# Patient Record
Sex: MALE | Race: BLACK OR AFRICAN AMERICAN | NOT HISPANIC OR LATINO | Employment: OTHER | URBAN - METROPOLITAN AREA
[De-identification: names, ages, dates, MRNs, and addresses within clinical notes are randomized per-mention and may not be internally consistent; named-entity substitution may affect disease eponyms.]

---

## 2017-03-31 PROBLEM — M79.605 LEFT LEG PAIN: Status: ACTIVE | Noted: 2017-03-31

## 2018-05-09 ENCOUNTER — TELEPHONE (OUTPATIENT)
Dept: ADMINISTRATIVE | Facility: HOSPITAL | Age: 58
End: 2018-05-09

## 2019-07-21 PROBLEM — K92.2 GI BLEED: Status: ACTIVE | Noted: 2019-07-21

## 2019-07-22 ENCOUNTER — HOSPITAL ENCOUNTER (INPATIENT)
Facility: HOSPITAL | Age: 59
LOS: 7 days | Discharge: HOME | DRG: 378 | End: 2019-07-29
Attending: INTERNAL MEDICINE | Admitting: INTERNAL MEDICINE
Payer: MEDICARE

## 2019-07-22 ENCOUNTER — ANESTHESIA EVENT (OUTPATIENT)
Dept: ENDOSCOPY | Facility: HOSPITAL | Age: 59
DRG: 378 | End: 2019-07-22
Payer: MEDICARE

## 2019-07-22 ENCOUNTER — ANESTHESIA (OUTPATIENT)
Dept: ENDOSCOPY | Facility: HOSPITAL | Age: 59
DRG: 378 | End: 2019-07-22
Payer: MEDICARE

## 2019-07-22 DIAGNOSIS — K92.2 GI BLEED: ICD-10-CM

## 2019-07-22 DIAGNOSIS — K92.2 GASTROINTESTINAL HEMORRHAGE, UNSPECIFIED GASTROINTESTINAL HEMORRHAGE TYPE: ICD-10-CM

## 2019-07-22 DIAGNOSIS — E53.8 CYANOCOBALAMINE DEFICIENCY (NON ANEMIC): ICD-10-CM

## 2019-07-22 PROBLEM — F10.10 ALCOHOL ABUSE: Status: ACTIVE | Noted: 2019-07-22

## 2019-07-22 LAB
ABO + RH BLD: NORMAL
ALBUMIN SERPL BCP-MCNC: 2.6 G/DL (ref 3.5–5.2)
ALP SERPL-CCNC: 58 U/L (ref 55–135)
ALT SERPL W/O P-5'-P-CCNC: 46 U/L (ref 10–44)
AMPHET+METHAMPHET UR QL: NEGATIVE
ANION GAP SERPL CALC-SCNC: 11 MMOL/L (ref 8–16)
AST SERPL-CCNC: 115 U/L (ref 10–40)
BARBITURATES UR QL SCN>200 NG/ML: NEGATIVE
BASOPHILS # BLD AUTO: 0.04 K/UL (ref 0–0.2)
BASOPHILS # BLD AUTO: 0.06 K/UL (ref 0–0.2)
BASOPHILS # BLD AUTO: 0.06 K/UL (ref 0–0.2)
BASOPHILS # BLD AUTO: 0.08 K/UL (ref 0–0.2)
BASOPHILS NFR BLD: 0.6 % (ref 0–1.9)
BASOPHILS NFR BLD: 0.7 % (ref 0–1.9)
BASOPHILS NFR BLD: 0.8 % (ref 0–1.9)
BASOPHILS NFR BLD: 1 % (ref 0–1.9)
BENZODIAZ UR QL SCN>200 NG/ML: NEGATIVE
BILIRUB SERPL-MCNC: 3.2 MG/DL (ref 0.1–1)
BLD GP AB SCN CELLS X3 SERPL QL: NORMAL
BLD PROD TYP BPU: NORMAL
BLOOD UNIT EXPIRATION DATE: NORMAL
BLOOD UNIT TYPE CODE: 600
BLOOD UNIT TYPE: NORMAL
BUN SERPL-MCNC: 21 MG/DL (ref 6–20)
BZE UR QL SCN: NORMAL
CALCIUM SERPL-MCNC: 9.3 MG/DL (ref 8.7–10.5)
CANNABINOIDS UR QL SCN: NORMAL
CHLORIDE SERPL-SCNC: 110 MMOL/L (ref 95–110)
CO2 SERPL-SCNC: 25 MMOL/L (ref 23–29)
CODING SYSTEM: NORMAL
CREAT SERPL-MCNC: 0.9 MG/DL (ref 0.5–1.4)
CREAT UR-MCNC: 85 MG/DL (ref 23–375)
DIFFERENTIAL METHOD: ABNORMAL
DISPENSE STATUS: NORMAL
EOSINOPHIL # BLD AUTO: 0 K/UL (ref 0–0.5)
EOSINOPHIL # BLD AUTO: 0 K/UL (ref 0–0.5)
EOSINOPHIL # BLD AUTO: 0.1 K/UL (ref 0–0.5)
EOSINOPHIL # BLD AUTO: 0.1 K/UL (ref 0–0.5)
EOSINOPHIL NFR BLD: 0.2 % (ref 0–8)
EOSINOPHIL NFR BLD: 0.5 % (ref 0–8)
EOSINOPHIL NFR BLD: 0.8 % (ref 0–8)
EOSINOPHIL NFR BLD: 1.1 % (ref 0–8)
ERYTHROCYTE [DISTWIDTH] IN BLOOD BY AUTOMATED COUNT: 22.4 % (ref 11.5–14.5)
ERYTHROCYTE [DISTWIDTH] IN BLOOD BY AUTOMATED COUNT: 22.7 % (ref 11.5–14.5)
ERYTHROCYTE [DISTWIDTH] IN BLOOD BY AUTOMATED COUNT: 23.9 % (ref 11.5–14.5)
ERYTHROCYTE [DISTWIDTH] IN BLOOD BY AUTOMATED COUNT: 24 % (ref 11.5–14.5)
EST. GFR  (AFRICAN AMERICAN): >60 ML/MIN/1.73 M^2
EST. GFR  (NON AFRICAN AMERICAN): >60 ML/MIN/1.73 M^2
ETHANOL UR-MCNC: <10 MG/DL
GLUCOSE SERPL-MCNC: 110 MG/DL (ref 70–110)
HCT VFR BLD AUTO: 22 % (ref 40–54)
HCT VFR BLD AUTO: 23.1 % (ref 40–54)
HCT VFR BLD AUTO: 23.7 % (ref 40–54)
HCT VFR BLD AUTO: 23.8 % (ref 40–54)
HGB BLD-MCNC: 6.9 G/DL (ref 14–18)
HGB BLD-MCNC: 7.1 G/DL (ref 14–18)
HGB BLD-MCNC: 7.5 G/DL (ref 14–18)
HGB BLD-MCNC: 7.6 G/DL (ref 14–18)
IMM GRANULOCYTES # BLD AUTO: 0.05 K/UL (ref 0–0.04)
IMM GRANULOCYTES # BLD AUTO: 0.06 K/UL (ref 0–0.04)
IMM GRANULOCYTES # BLD AUTO: 0.06 K/UL (ref 0–0.04)
IMM GRANULOCYTES # BLD AUTO: 0.07 K/UL (ref 0–0.04)
IMM GRANULOCYTES NFR BLD AUTO: 0.6 % (ref 0–0.5)
IMM GRANULOCYTES NFR BLD AUTO: 0.7 % (ref 0–0.5)
IMM GRANULOCYTES NFR BLD AUTO: 0.9 % (ref 0–0.5)
IMM GRANULOCYTES NFR BLD AUTO: 0.9 % (ref 0–0.5)
INR PPP: 1.3 (ref 0.8–1.2)
LYMPHOCYTES # BLD AUTO: 1 K/UL (ref 1–4.8)
LYMPHOCYTES # BLD AUTO: 1.4 K/UL (ref 1–4.8)
LYMPHOCYTES # BLD AUTO: 1.6 K/UL (ref 1–4.8)
LYMPHOCYTES # BLD AUTO: 1.6 K/UL (ref 1–4.8)
LYMPHOCYTES NFR BLD: 13.1 % (ref 18–48)
LYMPHOCYTES NFR BLD: 17.9 % (ref 18–48)
LYMPHOCYTES NFR BLD: 19.2 % (ref 18–48)
LYMPHOCYTES NFR BLD: 22.3 % (ref 18–48)
MAGNESIUM SERPL-MCNC: 1.5 MG/DL (ref 1.6–2.6)
MAGNESIUM SERPL-MCNC: 2.3 MG/DL (ref 1.6–2.6)
MCH RBC QN AUTO: 22.7 PG (ref 27–31)
MCH RBC QN AUTO: 23 PG (ref 27–31)
MCH RBC QN AUTO: 24.3 PG (ref 27–31)
MCH RBC QN AUTO: 24.7 PG (ref 27–31)
MCHC RBC AUTO-ENTMCNC: 30.7 G/DL (ref 32–36)
MCHC RBC AUTO-ENTMCNC: 31.4 G/DL (ref 32–36)
MCHC RBC AUTO-ENTMCNC: 31.5 G/DL (ref 32–36)
MCHC RBC AUTO-ENTMCNC: 32.1 G/DL (ref 32–36)
MCV RBC AUTO: 73 FL (ref 82–98)
MCV RBC AUTO: 74 FL (ref 82–98)
MCV RBC AUTO: 77 FL (ref 82–98)
MCV RBC AUTO: 77 FL (ref 82–98)
METHADONE UR QL SCN>300 NG/ML: NEGATIVE
MONOCYTES # BLD AUTO: 0.7 K/UL (ref 0.3–1)
MONOCYTES # BLD AUTO: 0.9 K/UL (ref 0.3–1)
MONOCYTES # BLD AUTO: 0.9 K/UL (ref 0.3–1)
MONOCYTES # BLD AUTO: 1.2 K/UL (ref 0.3–1)
MONOCYTES NFR BLD: 14.8 % (ref 4–15)
MONOCYTES NFR BLD: 15.3 % (ref 4–15)
MONOCYTES NFR BLD: 9.3 % (ref 4–15)
MONOCYTES NFR BLD: 9.6 % (ref 4–15)
NEUTROPHILS # BLD AUTO: 3.9 K/UL (ref 1.8–7.7)
NEUTROPHILS # BLD AUTO: 5.1 K/UL (ref 1.8–7.7)
NEUTROPHILS # BLD AUTO: 5.9 K/UL (ref 1.8–7.7)
NEUTROPHILS # BLD AUTO: 6.2 K/UL (ref 1.8–7.7)
NEUTROPHILS NFR BLD: 60.9 % (ref 38–73)
NEUTROPHILS NFR BLD: 63.4 % (ref 38–73)
NEUTROPHILS NFR BLD: 70 % (ref 38–73)
NEUTROPHILS NFR BLD: 75.4 % (ref 38–73)
NRBC BLD-RTO: 0 /100 WBC
NRBC BLD-RTO: 1 /100 WBC
OPIATES UR QL SCN: NORMAL
PCP UR QL SCN>25 NG/ML: NEGATIVE
PHOSPHATE SERPL-MCNC: 3.9 MG/DL (ref 2.7–4.5)
PLATELET # BLD AUTO: 54 K/UL (ref 150–350)
PLATELET # BLD AUTO: 58 K/UL (ref 150–350)
PLATELET # BLD AUTO: 70 K/UL (ref 150–350)
PLATELET # BLD AUTO: 72 K/UL (ref 150–350)
PMV BLD AUTO: 9.9 FL (ref 9.2–12.9)
PMV BLD AUTO: ABNORMAL FL (ref 9.2–12.9)
POCT GLUCOSE: 123 MG/DL (ref 70–110)
POTASSIUM SERPL-SCNC: 4.2 MMOL/L (ref 3.5–5.1)
PROT SERPL-MCNC: 6.3 G/DL (ref 6–8.4)
PROTHROMBIN TIME: 12.6 SEC (ref 9–12.5)
RBC # BLD AUTO: 3 M/UL (ref 4.6–6.2)
RBC # BLD AUTO: 3.08 M/UL (ref 4.6–6.2)
RBC # BLD AUTO: 3.09 M/UL (ref 4.6–6.2)
RBC # BLD AUTO: 3.13 M/UL (ref 4.6–6.2)
SODIUM SERPL-SCNC: 146 MMOL/L (ref 136–145)
TOXICOLOGY INFORMATION: NORMAL
TRANS ERYTHROCYTES VOL PATIENT: NORMAL ML
TROPONIN I SERPL DL<=0.01 NG/ML-MCNC: 0.02 NG/ML (ref 0–0.03)
WBC # BLD AUTO: 6.37 K/UL (ref 3.9–12.7)
WBC # BLD AUTO: 7.76 K/UL (ref 3.9–12.7)
WBC # BLD AUTO: 8.08 K/UL (ref 3.9–12.7)
WBC # BLD AUTO: 8.83 K/UL (ref 3.9–12.7)

## 2019-07-22 PROCEDURE — 85025 COMPLETE CBC W/AUTO DIFF WBC: CPT

## 2019-07-22 PROCEDURE — 80307 DRUG TEST PRSMV CHEM ANLYZR: CPT

## 2019-07-22 PROCEDURE — C9113 INJ PANTOPRAZOLE SODIUM, VIA: HCPCS | Performed by: STUDENT IN AN ORGANIZED HEALTH CARE EDUCATION/TRAINING PROGRAM

## 2019-07-22 PROCEDURE — 20000000 HC ICU ROOM

## 2019-07-22 PROCEDURE — 43235 EGD DIAGNOSTIC BRUSH WASH: CPT | Performed by: INTERNAL MEDICINE

## 2019-07-22 PROCEDURE — 63600175 PHARM REV CODE 636 W HCPCS: Performed by: STUDENT IN AN ORGANIZED HEALTH CARE EDUCATION/TRAINING PROGRAM

## 2019-07-22 PROCEDURE — 99223 PR INITIAL HOSPITAL CARE,LEVL III: ICD-10-PCS | Mod: AI,GC,, | Performed by: INTERNAL MEDICINE

## 2019-07-22 PROCEDURE — 99232 PR SUBSEQUENT HOSPITAL CARE,LEVL II: ICD-10-PCS | Mod: 25,GC,, | Performed by: INTERNAL MEDICINE

## 2019-07-22 PROCEDURE — P9021 RED BLOOD CELLS UNIT: HCPCS

## 2019-07-22 PROCEDURE — 85610 PROTHROMBIN TIME: CPT

## 2019-07-22 PROCEDURE — 93010 EKG 12-LEAD: ICD-10-PCS | Mod: ,,, | Performed by: INTERNAL MEDICINE

## 2019-07-22 PROCEDURE — 43235 PR EGD, FLEX, DIAGNOSTIC: ICD-10-PCS | Mod: GC,,, | Performed by: INTERNAL MEDICINE

## 2019-07-22 PROCEDURE — 83735 ASSAY OF MAGNESIUM: CPT | Mod: 91

## 2019-07-22 PROCEDURE — 43235 EGD DIAGNOSTIC BRUSH WASH: CPT | Mod: GC,,, | Performed by: INTERNAL MEDICINE

## 2019-07-22 PROCEDURE — 84484 ASSAY OF TROPONIN QUANT: CPT

## 2019-07-22 PROCEDURE — D9220A PRA ANESTHESIA: Mod: CRNA,,, | Performed by: NURSE ANESTHETIST, CERTIFIED REGISTERED

## 2019-07-22 PROCEDURE — D9220A PRA ANESTHESIA: ICD-10-PCS | Mod: CRNA,,, | Performed by: NURSE ANESTHETIST, CERTIFIED REGISTERED

## 2019-07-22 PROCEDURE — 25000003 PHARM REV CODE 250: Performed by: STUDENT IN AN ORGANIZED HEALTH CARE EDUCATION/TRAINING PROGRAM

## 2019-07-22 PROCEDURE — D9220A PRA ANESTHESIA: Mod: ANES,,, | Performed by: ANESTHESIOLOGY

## 2019-07-22 PROCEDURE — 99232 SBSQ HOSP IP/OBS MODERATE 35: CPT | Mod: 25,GC,, | Performed by: INTERNAL MEDICINE

## 2019-07-22 PROCEDURE — 37000009 HC ANESTHESIA EA ADD 15 MINS: Performed by: INTERNAL MEDICINE

## 2019-07-22 PROCEDURE — 36430 TRANSFUSION BLD/BLD COMPNT: CPT

## 2019-07-22 PROCEDURE — 84100 ASSAY OF PHOSPHORUS: CPT

## 2019-07-22 PROCEDURE — 83735 ASSAY OF MAGNESIUM: CPT

## 2019-07-22 PROCEDURE — 63600175 PHARM REV CODE 636 W HCPCS: Performed by: NURSE ANESTHETIST, CERTIFIED REGISTERED

## 2019-07-22 PROCEDURE — 93005 ELECTROCARDIOGRAM TRACING: CPT

## 2019-07-22 PROCEDURE — 94761 N-INVAS EAR/PLS OXIMETRY MLT: CPT

## 2019-07-22 PROCEDURE — 86850 RBC ANTIBODY SCREEN: CPT

## 2019-07-22 PROCEDURE — 80053 COMPREHEN METABOLIC PANEL: CPT

## 2019-07-22 PROCEDURE — 86920 COMPATIBILITY TEST SPIN: CPT

## 2019-07-22 PROCEDURE — 37000008 HC ANESTHESIA 1ST 15 MINUTES: Performed by: INTERNAL MEDICINE

## 2019-07-22 PROCEDURE — 99223 1ST HOSP IP/OBS HIGH 75: CPT | Mod: AI,GC,, | Performed by: INTERNAL MEDICINE

## 2019-07-22 PROCEDURE — 93010 ELECTROCARDIOGRAM REPORT: CPT | Mod: ,,, | Performed by: INTERNAL MEDICINE

## 2019-07-22 PROCEDURE — D9220A PRA ANESTHESIA: ICD-10-PCS | Mod: ANES,,, | Performed by: ANESTHESIOLOGY

## 2019-07-22 RX ORDER — CEFTRIAXONE 1 G/1
1 INJECTION, POWDER, FOR SOLUTION INTRAMUSCULAR; INTRAVENOUS
Status: COMPLETED | OUTPATIENT
Start: 2019-07-22 | End: 2019-07-25

## 2019-07-22 RX ORDER — DIAZEPAM 5 MG/1
10 TABLET ORAL EVERY 6 HOURS PRN
Status: DISCONTINUED | OUTPATIENT
Start: 2019-07-22 | End: 2019-07-29 | Stop reason: HOSPADM

## 2019-07-22 RX ORDER — LIDOCAINE 50 MG/G
1 PATCH TOPICAL
Status: DISCONTINUED | OUTPATIENT
Start: 2019-07-22 | End: 2019-07-29 | Stop reason: HOSPADM

## 2019-07-22 RX ORDER — FOLIC ACID 1 MG/1
1 TABLET ORAL DAILY
Status: DISCONTINUED | OUTPATIENT
Start: 2019-07-22 | End: 2019-07-29 | Stop reason: HOSPADM

## 2019-07-22 RX ORDER — PANTOPRAZOLE SODIUM 40 MG/10ML
40 INJECTION, POWDER, LYOPHILIZED, FOR SOLUTION INTRAVENOUS DAILY
Status: DISCONTINUED | OUTPATIENT
Start: 2019-07-22 | End: 2019-07-22

## 2019-07-22 RX ORDER — PHENYLEPHRINE HYDROCHLORIDE 10 MG/ML
INJECTION INTRAVENOUS
Status: DISCONTINUED | OUTPATIENT
Start: 2019-07-22 | End: 2019-07-22

## 2019-07-22 RX ORDER — LORAZEPAM 2 MG/ML
2 INJECTION INTRAMUSCULAR ONCE
Status: COMPLETED | OUTPATIENT
Start: 2019-07-22 | End: 2019-07-22

## 2019-07-22 RX ORDER — OXYCODONE HYDROCHLORIDE 5 MG/1
5 TABLET ORAL ONCE
Status: COMPLETED | OUTPATIENT
Start: 2019-07-23 | End: 2019-07-22

## 2019-07-22 RX ORDER — MAGNESIUM SULFATE HEPTAHYDRATE 40 MG/ML
2 INJECTION, SOLUTION INTRAVENOUS
Status: COMPLETED | OUTPATIENT
Start: 2019-07-22 | End: 2019-07-22

## 2019-07-22 RX ORDER — PANTOPRAZOLE SODIUM 40 MG/10ML
40 INJECTION, POWDER, LYOPHILIZED, FOR SOLUTION INTRAVENOUS 2 TIMES DAILY
Status: DISCONTINUED | OUTPATIENT
Start: 2019-07-22 | End: 2019-07-23

## 2019-07-22 RX ORDER — LIDOCAINE HCL/PF 100 MG/5ML
SYRINGE (ML) INTRAVENOUS
Status: DISCONTINUED | OUTPATIENT
Start: 2019-07-22 | End: 2019-07-22

## 2019-07-22 RX ORDER — CEFTRIAXONE 1 G/1
1 INJECTION, POWDER, FOR SOLUTION INTRAMUSCULAR; INTRAVENOUS
Status: DISCONTINUED | OUTPATIENT
Start: 2019-07-22 | End: 2019-07-22

## 2019-07-22 RX ORDER — THIAMINE HCL 100 MG
100 TABLET ORAL DAILY
Status: DISCONTINUED | OUTPATIENT
Start: 2019-07-22 | End: 2019-07-29 | Stop reason: HOSPADM

## 2019-07-22 RX ORDER — PANTOPRAZOLE SODIUM 40 MG/1
40 TABLET, DELAYED RELEASE ORAL
Status: DISCONTINUED | OUTPATIENT
Start: 2019-07-22 | End: 2019-07-22

## 2019-07-22 RX ORDER — PROPOFOL 10 MG/ML
VIAL (ML) INTRAVENOUS
Status: DISCONTINUED | OUTPATIENT
Start: 2019-07-22 | End: 2019-07-22

## 2019-07-22 RX ORDER — HYDROCODONE BITARTRATE AND ACETAMINOPHEN 500; 5 MG/1; MG/1
TABLET ORAL
Status: DISCONTINUED | OUTPATIENT
Start: 2019-07-22 | End: 2019-07-24

## 2019-07-22 RX ORDER — ONDANSETRON 2 MG/ML
4 INJECTION INTRAMUSCULAR; INTRAVENOUS EVERY 6 HOURS PRN
Status: DISCONTINUED | OUTPATIENT
Start: 2019-07-22 | End: 2019-07-29 | Stop reason: HOSPADM

## 2019-07-22 RX ADMIN — Medication 100 MG: at 08:07

## 2019-07-22 RX ADMIN — PROPOFOL 50 MG: 10 INJECTION, EMULSION INTRAVENOUS at 05:07

## 2019-07-22 RX ADMIN — LIDOCAINE HYDROCHLORIDE 100 MG: 20 INJECTION, SOLUTION INTRAVENOUS at 05:07

## 2019-07-22 RX ADMIN — LIDOCAINE 1 PATCH: 50 PATCH TOPICAL at 09:07

## 2019-07-22 RX ADMIN — FOLIC ACID 1 MG: 1 TABLET ORAL at 08:07

## 2019-07-22 RX ADMIN — OCTREOTIDE ACETATE 50 MCG/HR: 1000 INJECTION, SOLUTION INTRAVENOUS; SUBCUTANEOUS at 01:07

## 2019-07-22 RX ADMIN — OXYCODONE HYDROCHLORIDE 5 MG: 5 TABLET ORAL at 11:07

## 2019-07-22 RX ADMIN — MAGNESIUM SULFATE IN WATER 2 G: 40 INJECTION, SOLUTION INTRAVENOUS at 06:07

## 2019-07-22 RX ADMIN — SODIUM CHLORIDE 200 ML: 0.9 INJECTION, SOLUTION INTRAVENOUS at 06:07

## 2019-07-22 RX ADMIN — PANTOPRAZOLE SODIUM 8 MG/HR: 40 INJECTION, POWDER, FOR SOLUTION INTRAVENOUS at 01:07

## 2019-07-22 RX ADMIN — SODIUM CHLORIDE: 0.9 INJECTION, SOLUTION INTRAVENOUS at 05:07

## 2019-07-22 RX ADMIN — PANTOPRAZOLE SODIUM 40 MG: 40 INJECTION, POWDER, FOR SOLUTION INTRAVENOUS at 09:07

## 2019-07-22 RX ADMIN — LORAZEPAM 2 MG: 2 INJECTION INTRAMUSCULAR; INTRAVENOUS at 10:07

## 2019-07-22 RX ADMIN — CEFTRIAXONE SODIUM 1 G: 1 INJECTION, POWDER, FOR SOLUTION INTRAMUSCULAR; INTRAVENOUS at 06:07

## 2019-07-22 RX ADMIN — THERA TABS 1 TABLET: TAB at 08:07

## 2019-07-22 RX ADMIN — PHENYLEPHRINE HYDROCHLORIDE 200 MCG: 10 INJECTION INTRAVENOUS at 05:07

## 2019-07-22 RX ADMIN — MAGNESIUM SULFATE IN WATER 2 G: 40 INJECTION, SOLUTION INTRAVENOUS at 03:07

## 2019-07-22 RX ADMIN — Medication 100 MG: at 01:07

## 2019-07-22 RX ADMIN — PROPOFOL 50 MG: 10 INJECTION, EMULSION INTRAVENOUS at 06:07

## 2019-07-22 RX ADMIN — DIAZEPAM 10 MG: 5 TABLET ORAL at 10:07

## 2019-07-22 RX ADMIN — OCTREOTIDE ACETATE 50 MCG/HR: 1000 INJECTION, SOLUTION INTRAVENOUS; SUBCUTANEOUS at 09:07

## 2019-07-22 RX ADMIN — DIAZEPAM 10 MG: 5 TABLET ORAL at 04:07

## 2019-07-22 RX ADMIN — OCTREOTIDE ACETATE 50 MCG/HR: 1000 INJECTION, SOLUTION INTRAVENOUS; SUBCUTANEOUS at 06:07

## 2019-07-22 RX ADMIN — PROPOFOL 100 MG: 10 INJECTION, EMULSION INTRAVENOUS at 05:07

## 2019-07-22 NOTE — INTERVAL H&P NOTE
Pre-Procedure H and P Addendum    Patient seen and examined.  History and exam unchanged from prior history and physical.      Procedure: EGD  Indication: GI bleeding  ASA Class: per anesthesiology  Airway: normal  Neck Mobility: full range of motion  Mallampatti score: per anesthesia  History of anesthesia problems: no  Family history of anesthesia problems: no  Anesthesia Plan: MAC    Anesthesia/Surgery risks, benefits and alternative options discussed and understood by patient/family.          Active Hospital Problems    Diagnosis  POA    *GI bleed [K92.2]  Yes    Alcohol abuse [F10.10]  Unknown    Anemia [D64.9]  Yes    Bleeding gastric varices [I86.4]  Yes      Resolved Hospital Problems   No resolved problems to display.

## 2019-07-22 NOTE — SUBJECTIVE & OBJECTIVE
Past Medical History:   Diagnosis Date    Arthritis     Hep C w/o coma, chronic     Hypertension     Laennec's cirrhosis     Liver disease        Past Surgical History:   Procedure Laterality Date    COLONOSCOPY      COLONOSCOPY N/A 2/11/2016    Performed by Ale Gonzalez MD at [facility] (4TH FLR)    ESOPHAGOGASTRODUODENOSCOPY (EGD) N/A 2/11/2016    Performed by Ale Gonzalez MD at [facility] END[facility] (4TH FLR)    WRIST SURGERY         Review of patient's allergies indicates:   Allergen Reactions    Aspirin Other (See Comments)     Bleeding/Cirrhosis     Family History     Problem Relation (Age of Onset)    Cancer Father    Diabetes Mother    Heart disease Brother    Hypertension Mother        Tobacco Use    Smoking status: Current Every Day Smoker     Packs/day: 1.50     Years: 40.00     Pack years: 60.00     Types: Cigarettes    Smokeless tobacco: Never Used   Substance and Sexual Activity    Alcohol use: Yes     Alcohol/week: 7.2 oz     Types: 12 Cans of beer per week     Comment: daily    Drug use: No     Types: Marijuana    Sexual activity: Never     Review of Systems   Constitutional: Negative for activity change, appetite change, chills, diaphoresis, fatigue, fever and unexpected weight change.   HENT: Negative for trouble swallowing and voice change.    Eyes: Negative for photophobia, pain, discharge, redness, itching and visual disturbance.   Respiratory: Negative for cough and shortness of breath.    Cardiovascular: Negative for chest pain, palpitations and leg swelling.   Gastrointestinal: Positive for nausea and vomiting. Negative for abdominal distention, abdominal pain, anal bleeding, blood in stool, constipation, diarrhea and rectal pain.   Endocrine: Negative for cold intolerance, heat intolerance, polydipsia, polyphagia and polyuria.   Genitourinary: Negative for dysuria, frequency, hematuria and urgency.   Musculoskeletal: Positive for back pain. Negative for neck pain.   Skin:  Negative for color change, pallor, rash and wound.   Neurological: Negative for dizziness, seizures, weakness and light-headedness.     Objective:     Vital Signs (Most Recent):  Temp: 98.4 °F (36.9 °C) (07/22/19 0300)  Pulse: 69 (07/22/19 0600)  Resp: 14 (07/22/19 0600)  BP: 136/72 (07/22/19 0600)  SpO2: (!) 94 % (07/22/19 0600) Vital Signs (24h Range):  Temp:  [98 °F (36.7 °C)-99 °F (37.2 °C)] 98.4 °F (36.9 °C)  Pulse:  [] 69  Resp:  [13-24] 14  SpO2:  [94 %-100 %] 94 %  BP: (136-171)/(72-87) 136/72     Weight: 70.2 kg (154 lb 12.2 oz) (07/22/19 0005)  Body mass index is 24.24 kg/m².      Intake/Output Summary (Last 24 hours) at 7/22/2019 0714  Last data filed at 7/22/2019 0600  Gross per 24 hour   Intake 235 ml   Output 600 ml   Net -365 ml       Lines/Drains/Airways     Peripheral Intravenous Line                 Peripheral IV - Single Lumen 07/21/19 1702 18 G Left Antecubital less than 1 day         Peripheral IV - Single Lumen 07/21/19 1720 18 G Right Upper Arm less than 1 day         Peripheral IV - Single Lumen 07/21/19 1919 20 G Right Forearm less than 1 day                Physical Exam   Constitutional: He is oriented to person, place, and time. No distress.   HENT:   Head: Normocephalic and atraumatic.   Eyes: No scleral icterus.   Cardiovascular: Normal rate and regular rhythm.   Pulmonary/Chest: Effort normal and breath sounds normal.   Abdominal: Soft. Bowel sounds are normal.   Musculoskeletal: He exhibits no edema.   Neurological: He is alert and oriented to person, place, and time.   Skin: He is not diaphoretic.   Nursing note and vitals reviewed.      Significant Labs:  CBC:   Recent Labs   Lab 07/21/19  1720 07/22/19  0024 07/22/19  0600   WBC 8.99 8.08 6.37   HGB 5.8* 7.1* 6.9*   HCT 20.2* 23.1* 22.0*   * 70* 54*     CMP:   Recent Labs   Lab 07/22/19  0024      CALCIUM 9.3   ALBUMIN 2.6*   PROT 6.3   *   K 4.2   CO2 25      BUN 21*   CREATININE 0.9   ALKPHOS 58    ALT 46*   *   BILITOT 3.2*     Coagulation:   Recent Labs   Lab 07/22/19  0024   INR 1.3*       Significant Imaging:  Imaging results within the past 24 hours have been reviewed.

## 2019-07-22 NOTE — TRANSFER OF CARE
"Anesthesia Transfer of Care Note    Patient: Abdulkadir Gaines    Procedure(s) Performed: Procedure(s) (LRB):  EGD (ESOPHAGOGASTRODUODENOSCOPY) (N/A)    Patient location: ICU    Anesthesia Type: general    Transport from OR: Transported from OR on 2-3 L/min O2 by NC with adequate spontaneous ventilation    Post pain: adequate analgesia    Post assessment: no apparent anesthetic complications and tolerated procedure well    Post vital signs: stable    Level of consciousness: lethargic    Nausea/Vomiting: no nausea/vomiting    Complications: none    Transfer of care protocol was followed      Last vitals:   Visit Vitals  /66 (BP Location: Right arm, Patient Position: Lying)   Pulse 74   Temp 37.1 °C (98.7 °F) (Oral)   Resp 11   Ht 5' 7" (1.702 m)   Wt 70.2 kg (154 lb 12.2 oz)   SpO2 95%   BMI 24.24 kg/m²     "

## 2019-07-22 NOTE — SUBJECTIVE & OBJECTIVE
Past Medical History:   Diagnosis Date    Arthritis     Hep C w/o coma, chronic     Hypertension     Laennec's cirrhosis     Liver disease        Past Surgical History:   Procedure Laterality Date    COLONOSCOPY      COLONOSCOPY N/A 2/11/2016    Performed by Ale Gonzalez MD at [facility] END[facility] (4TH FLR)    ESOPHAGOGASTRODUODENOSCOPY (EGD) N/A 2/11/2016    Performed by Ale Gonzalez MD at [facility] ENDO (4TH FLR)    WRIST SURGERY         Review of patient's allergies indicates:   Allergen Reactions    Aspirin Other (See Comments)     Bleeding/Cirrhosis       Family History     Problem Relation (Age of Onset)    Cancer Father    Diabetes Mother    Heart disease Brother    Hypertension Mother        Tobacco Use    Smoking status: Current Every Day Smoker     Packs/day: 1.50     Years: 40.00     Pack years: 60.00     Types: Cigarettes    Smokeless tobacco: Never Used   Substance and Sexual Activity    Alcohol use: Yes     Alcohol/week: 7.2 oz     Types: 12 Cans of beer per week     Comment: daily    Drug use: No     Types: Marijuana    Sexual activity: Never      Review of Systems   Constitutional: Negative for chills, diaphoresis and fever.   HENT: Negative for congestion, trouble swallowing and voice change.    Eyes: Negative for photophobia and visual disturbance.   Respiratory: Negative for chest tightness and shortness of breath.    Cardiovascular: Negative for chest pain, palpitations and leg swelling.   Gastrointestinal: Positive for abdominal pain, nausea and vomiting. Negative for abdominal distention, anal bleeding and blood in stool.        Coffee ground emesis    Endocrine: Negative for polydipsia, polyphagia and polyuria.   Genitourinary: Negative for difficulty urinating, dysuria and testicular pain.   Musculoskeletal: Negative for arthralgias and gait problem.   Skin: Negative for pallor, rash and wound.   Neurological: Negative for dizziness, seizures and headaches.   Hematological:  Negative for adenopathy. Does not bruise/bleed easily.     Objective:     Vital Signs (Most Recent):  Temp: 98.5 °F (36.9 °C) (07/22/19 0005)  Pulse: 95 (07/22/19 0005)  Resp: 14 (07/22/19 0005)  BP: (!) 171/82 (07/22/19 0005)  SpO2: 95 % (07/22/19 0005) Vital Signs (24h Range):  Temp:  [98 °F (36.7 °C)-99 °F (37.2 °C)] 98.5 °F (36.9 °C)  Pulse:  [] 95  Resp:  [14-18] 14  SpO2:  [95 %-100 %] 95 %  BP: (140-171)/(73-82) 171/82   Weight: 70.2 kg (154 lb 12.2 oz)  Body mass index is 24.24 kg/m².    No intake or output data in the 24 hours ending 07/22/19 0049    Physical Exam   Constitutional: He is oriented to person, place, and time. He appears well-developed and well-nourished. No distress.   AA male, resting comfortably in bed, non toxic appearing    HENT:   Head: Normocephalic and atraumatic.   Eyes: Pupils are equal, round, and reactive to light. EOM are normal. Right eye exhibits no discharge. Left eye exhibits no discharge.   Neck: Normal range of motion. Neck supple.   Cardiovascular: Normal rate, regular rhythm, normal heart sounds and intact distal pulses. Exam reveals no gallop and no friction rub.   No murmur heard.  Pulmonary/Chest: Effort normal and breath sounds normal. No stridor. No respiratory distress. He has no wheezes. He has no rales.   Abdominal: Bowel sounds are normal. He exhibits no distension and no mass. There is tenderness. There is no rebound and no guarding.   TTP of epigastrum, no rebound, no guarding    Musculoskeletal: Normal range of motion. He exhibits no edema, tenderness or deformity.   Neurological: He is alert and oriented to person, place, and time. No cranial nerve deficit.   Skin: Skin is warm and dry. Capillary refill takes less than 2 seconds.       Vents:     Lines/Drains/Airways     Peripheral Intravenous Line                 Peripheral IV - Single Lumen 07/21/19 1702 18 G Left Antecubital less than 1 day         Peripheral IV - Single Lumen 07/21/19 1720 18 G Right  Upper Arm less than 1 day         Peripheral IV - Single Lumen 07/21/19 1919 20 G Right Forearm less than 1 day              Significant Labs:    CBC/Anemia Profile:  Recent Labs   Lab 07/21/19  1720 07/21/19  1811   WBC 8.99  --    HGB 5.8*  --    HCT 20.2*  --    *  --    MCV 71*  --    RDW 23.2*  --    OCCULTBLOOD  --  Positive*        Chemistries:  Recent Labs   Lab 07/21/19  1720   *   K 4.1      CO2 24   BUN 21*   CREATININE 0.8   CALCIUM 9.4   ALBUMIN 2.9*   PROT 7.0   BILITOT 1.6*   ALKPHOS 65   ALT 50*   *         Significant Imaging: I have reviewed all pertinent imaging results/findings within the past 24 hours.

## 2019-07-22 NOTE — H&P (VIEW-ONLY)
[facility]  Gastroenterology  Consult Note    Patient Name: Abdulkadir Gaines  MRN: [id number]  Admission Date: 7/22/2019  Hospital Length of Stay: 0 days  Code Status: No Order   Attending Provider: Ronnie Duran MD   Consulting Provider: Kina Jo MD  Primary Care Physician: June Rodriguez NP  Principal Problem:GI bleed    Inpatient consult to Gastroenterology  Consult performed by: Kina Jo MD  Consult ordered by: Mal Parra MD        Subjective:     HPI:  59 year old male with a history of HCV/Alcohol Cirrhosis on who GI is being consulted for for hematemesis.    Per patient he began drinking again and between Friday/Saturday he drank 1 case of beer ( 24 beers). He stayed in bed most of Saturday until she got up due to nausea. He reported multiple episodes of emesis which were progressively bloodier. This prompted him to go to an OSH ED yesterday. There he was noted to have a hemoglobin of 5.8 down from 8.8. He was started on antibiotics/PPI/Octreotide and 2 units of blood were ordered. He was then transferred to [facility]. Since arrival he has been stable with no further hematemesis, melena, or hematochezia.     Past Medical History:   Diagnosis Date    Arthritis     Hep C w/o coma, chronic     Hypertension     Laennec's cirrhosis     Liver disease        Past Surgical History:   Procedure Laterality Date    COLONOSCOPY      COLONOSCOPY N/A 2/11/2016    Performed by Ale [facility] MD at [facility] ENDO (4TH FLR)    ESOPHAGOGASTRODUODENOSCOPY (EGD) N/A 2/11/2016    Performed by Ale Gonzalez MD at [facility] ENDO (4TH FLR)    WRIST SURGERY         Review of patient's allergies indicates:   Allergen Reactions    Aspirin Other (See Comments)     Bleeding/Cirrhosis     Family History     Problem Relation (Age of Onset)    Cancer Father    Diabetes Mother    Heart disease Brother    Hypertension Mother        Tobacco Use    Smoking status: Current Every Day Smoker      Packs/day: 1.50     Years: 40.00     Pack years: 60.00     Types: Cigarettes    Smokeless tobacco: Never Used   Substance and Sexual Activity    Alcohol use: Yes     Alcohol/week: 7.2 oz     Types: 12 Cans of beer per week     Comment: daily    Drug use: No     Types: Marijuana    Sexual activity: Never     Review of Systems   Constitutional: Negative for activity change, appetite change, chills, diaphoresis, fatigue, fever and unexpected weight change.   HENT: Negative for trouble swallowing and voice change.    Eyes: Negative for photophobia, pain, discharge, redness, itching and visual disturbance.   Respiratory: Negative for cough and shortness of breath.    Cardiovascular: Negative for chest pain, palpitations and leg swelling.   Gastrointestinal: Positive for nausea and vomiting. Negative for abdominal distention, abdominal pain, anal bleeding, blood in stool, constipation, diarrhea and rectal pain.   Endocrine: Negative for cold intolerance, heat intolerance, polydipsia, polyphagia and polyuria.   Genitourinary: Negative for dysuria, frequency, hematuria and urgency.   Musculoskeletal: Positive for back pain. Negative for neck pain.   Skin: Negative for color change, pallor, rash and wound.   Neurological: Negative for dizziness, seizures, weakness and light-headedness.     Objective:     Vital Signs (Most Recent):  Temp: 98.4 °F (36.9 °C) (07/22/19 0300)  Pulse: 69 (07/22/19 0600)  Resp: 14 (07/22/19 0600)  BP: 136/72 (07/22/19 0600)  SpO2: (!) 94 % (07/22/19 0600) Vital Signs (24h Range):  Temp:  [98 °F (36.7 °C)-99 °F (37.2 °C)] 98.4 °F (36.9 °C)  Pulse:  [] 69  Resp:  [13-24] 14  SpO2:  [94 %-100 %] 94 %  BP: (136-171)/(72-87) 136/72     Weight: 70.2 kg (154 lb 12.2 oz) (07/22/19 0005)  Body mass index is 24.24 kg/m².      Intake/Output Summary (Last 24 hours) at 7/22/2019 0714  Last data filed at 7/22/2019 0600  Gross per 24 hour   Intake 235 ml   Output 600 ml   Net -365 ml        Lines/Drains/Airways     Peripheral Intravenous Line                 Peripheral IV - Single Lumen 07/21/19 1702 18 G Left Antecubital less than 1 day         Peripheral IV - Single Lumen 07/21/19 1720 18 G Right Upper Arm less than 1 day         Peripheral IV - Single Lumen 07/21/19 1919 20 G Right Forearm less than 1 day                Physical Exam   Constitutional: He is oriented to person, place, and time. No distress.   HENT:   Head: Normocephalic and atraumatic.   Eyes: No scleral icterus.   Cardiovascular: Normal rate and regular rhythm.   Pulmonary/Chest: Effort normal and breath sounds normal.   Abdominal: Soft. Bowel sounds are normal.   Musculoskeletal: He exhibits no edema.   Neurological: He is alert and oriented to person, place, and time.   Skin: He is not diaphoretic.   Nursing note and vitals reviewed.      Significant Labs:  CBC:   Recent Labs   Lab 07/21/19  1720 07/22/19  0024 07/22/19  0600   WBC 8.99 8.08 6.37   HGB 5.8* 7.1* 6.9*   HCT 20.2* 23.1* 22.0*   * 70* 54*     CMP:   Recent Labs   Lab 07/22/19  0024      CALCIUM 9.3   ALBUMIN 2.6*   PROT 6.3   *   K 4.2   CO2 25      BUN 21*   CREATININE 0.9   ALKPHOS 58   ALT 46*   *   BILITOT 3.2*     Coagulation:   Recent Labs   Lab 07/22/19  0024   INR 1.3*       Significant Imaging:  Imaging results within the past 24 hours have been reviewed.    Assessment/Plan:     * GI bleed  59 year old male with a history of HCV/Alcohol Cirrhosis on who GI is being consulted for for hematemesis. In the setting of cirrhosis with reported hematemesis will proceed with EGD today. Presentation could be from MV tear vs PHG vs less like an acute variceal bleed.    Recommendations:  -NPO  -Maintain IV access with 2 large bore IVs  -Intravascular resuscitation/support with IVFs   -Serial H/H's and pRBCs transfusion as indicated  -Protonix 80mg IV bolus x1 and then gtt at 8mg/hr  -Octreotide gtt at 50mcg/hr  -Ceftriaxone 1g IV Q  daily for primary SBP prophylaxis  -Discontinue all NSAIDs and Heparin products  -Please correct any coagulopathy with platelets and FFP to a goal of platelets >50K and INR <2.0  -Plan for EGD today  -Obtain US of the liver with doppler        Thank you for your consult. I will follow-up with patient. Please contact us if you have any additional questions.    Kina Jo M.D.  Gastroenterology Fellow, PGY-VI  Pager: [phone]  [facility]

## 2019-07-22 NOTE — HPI
59 year old AA male PMH hepatitis C, alcohol abuse, HTN, anemia, gastric variceal bleed who presented to [facility] with hematemesis. He states yesterday he had abdominal pain, then multiple episodes of coffee ground emesis. He was found to have hgb 5.8, FOBT+, type and screened, given 2 units of blood, protonix, octreotide, then transferred to [facility] for Gastroenterology. He state he has had this occur twice in the past. He does endorse drinking 24 pack of beer/day. He states he has not had withdrawal seizures before. Last time he drink was yesterday prior to coming to the hospital

## 2019-07-22 NOTE — H&P
[facility]  Critical Care Medicine  History & Physical    Patient Name: Abdulkadir Gaines  MRN: [id number]  Admission Date: 7/22/2019  Hospital Length of Stay: 0 days  Code Status: No Order  Attending Physician: Ronnie Duran MD   Primary Care Provider: June Rodriguez NP   Principal Problem: GI bleed    Subjective:     HPI:  59 year old AA male PMH hepatitis C, alcohol abuse, HTN, anemia, gastric variceal bleed who presented to [facility] with hematemesis. He states yesterday he had abdominal pain, then multiple episodes of coffee ground emesis. He was found to have hgb 5.8, FOBT+, type and screened, given 2 units of blood, protonix, octreotide, then transferred to [facility] for Gastroenterology. He state he has had this occur twice in the past. He does endorse drinking 24 pack of beer/day. He states he has not had withdrawal seizures before. Last time he drink was yesterday prior to coming to the hospital     Hospital/ICU Course:  No notes on file     Past Medical History:   Diagnosis Date    Arthritis     Hep C w/o coma, chronic     Hypertension     Laennec's cirrhosis     Liver disease        Past Surgical History:   Procedure Laterality Date    COLONOSCOPY      COLONOSCOPY N/A 2/11/2016    Performed by Ale Gonzalez MD at [facility] ENDO (4TH FLR)    ESOPHAGOGASTRODUODENOSCOPY (EGD) N/A 2/11/2016    Performed by Ale Gonzalez MD at [facility] ENDO (4TH FLR)    WRIST SURGERY         Review of patient's allergies indicates:   Allergen Reactions    Aspirin Other (See Comments)     Bleeding/Cirrhosis       Family History     Problem Relation (Age of Onset)    Cancer Father    Diabetes Mother    Heart disease Brother    Hypertension Mother        Tobacco Use    Smoking status: Current Every Day Smoker     Packs/day: 1.50     Years: 40.00     Pack years: 60.00     Types: Cigarettes    Smokeless tobacco: Never Used   Substance and Sexual Activity    Alcohol use: Yes      Alcohol/week: 7.2 oz     Types: 12 Cans of beer per week     Comment: daily    Drug use: No     Types: Marijuana    Sexual activity: Never      Review of Systems   Constitutional: Negative for chills, diaphoresis and fever.   HENT: Negative for congestion, trouble swallowing and voice change.    Eyes: Negative for photophobia and visual disturbance.   Respiratory: Negative for chest tightness and shortness of breath.    Cardiovascular: Negative for chest pain, palpitations and leg swelling.   Gastrointestinal: Positive for abdominal pain, nausea and vomiting. Negative for abdominal distention, anal bleeding and blood in stool.        Coffee ground emesis    Endocrine: Negative for polydipsia, polyphagia and polyuria.   Genitourinary: Negative for difficulty urinating, dysuria and testicular pain.   Musculoskeletal: Negative for arthralgias and gait problem.   Skin: Negative for pallor, rash and wound.   Neurological: Negative for dizziness, seizures and headaches.   Hematological: Negative for adenopathy. Does not bruise/bleed easily.     Objective:     Vital Signs (Most Recent):  Temp: 98.5 °F (36.9 °C) (07/22/19 0005)  Pulse: 95 (07/22/19 0005)  Resp: 14 (07/22/19 0005)  BP: (!) 171/82 (07/22/19 0005)  SpO2: 95 % (07/22/19 0005) Vital Signs (24h Range):  Temp:  [98 °F (36.7 °C)-99 °F (37.2 °C)] 98.5 °F (36.9 °C)  Pulse:  [] 95  Resp:  [14-18] 14  SpO2:  [95 %-100 %] 95 %  BP: (140-171)/(73-82) 171/82   Weight: 70.2 kg (154 lb 12.2 oz)  Body mass index is 24.24 kg/m².    No intake or output data in the 24 hours ending 07/22/19 0049    Physical Exam   Constitutional: He is oriented to person, place, and time. He appears well-developed and well-nourished. No distress.   AA male, resting comfortably in bed, non toxic appearing    HENT:   Head: Normocephalic and atraumatic.   Eyes: Pupils are equal, round, and reactive to light. EOM are normal. Right eye exhibits no discharge. Left eye exhibits no discharge.    Neck: Normal range of motion. Neck supple.   Cardiovascular: Normal rate, regular rhythm, normal heart sounds and intact distal pulses. Exam reveals no gallop and no friction rub.   No murmur heard.  Pulmonary/Chest: Effort normal and breath sounds normal. No stridor. No respiratory distress. He has no wheezes. He has no rales.   Abdominal: Bowel sounds are normal. He exhibits no distension and no mass. There is tenderness. There is no rebound and no guarding.   TTP of epigastrum, no rebound, no guarding    Musculoskeletal: Normal range of motion. He exhibits no edema, tenderness or deformity.   Neurological: He is alert and oriented to person, place, and time. No cranial nerve deficit.   Skin: Skin is warm and dry. Capillary refill takes less than 2 seconds.       Vents:     Lines/Drains/Airways     Peripheral Intravenous Line                 Peripheral IV - Single Lumen 07/21/19 1702 18 G Left Antecubital less than 1 day         Peripheral IV - Single Lumen 07/21/19 1720 18 G Right Upper Arm less than 1 day         Peripheral IV - Single Lumen 07/21/19 1919 20 G Right Forearm less than 1 day              Significant Labs:    CBC/Anemia Profile:  Recent Labs   Lab 07/21/19  1720 07/21/19  1811   WBC 8.99  --    HGB 5.8*  --    HCT 20.2*  --    *  --    MCV 71*  --    RDW 23.2*  --    OCCULTBLOOD  --  Positive*        Chemistries:  Recent Labs   Lab 07/21/19  1720   *   K 4.1      CO2 24   BUN 21*   CREATININE 0.8   CALCIUM 9.4   ALBUMIN 2.9*   PROT 7.0   BILITOT 1.6*   ALKPHOS 65   ALT 50*   *         Significant Imaging: I have reviewed all pertinent imaging results/findings within the past 24 hours.    Assessment/Plan:     Psychiatric  Alcohol abuse  -drinks 24 beers/day  -[facility] protocol iniated  -Diazepam PRN    Oncology  Anemia  -Hgb 5.8  -Given 2 units of PRBC  -Recheck CBC q6 hrs    GI  * GI bleed  -Hep C, Alcohol abuse  -hx of variceal bleed  -Protonix drip  -Octreotide  -Rocephin  QD  -Type and Screen  -GI consulted    Bleeding gastric varices  -See GI bleed        Critical Care Daily Checklist:    A: Awake: RASS Goal/Actual Goal:    Actual:     B: Spontaneous Breathing Trial Performed?     C: SAT & SBT Coordinated?                     D: Delirium: CAM-ICU     E: Early Mobility Performed? Yes   F: Feeding Goal:    Status:     Current Diet Order   Procedures    Diet NPO Except for: Medication     Order Specific Question:   Except for     Answer:   Medication      AS: Analgesia/Sedation NA   T: Thromboembolic Prophylaxis None, current GI bleed   H: HOB > 300 Yes   U: Stress Ulcer Prophylaxis (if needed) Protonix   G: Glucose Control N/A   B: Bowel Function     I: Indwelling Catheter (Lines & Pollack) Necessity 18 gauge b/l AC     D: De-escalation of Antimicrobials/Pharmacotherapies Rocephin QD    Plan for the day/ETD     Code Status:  Family/Goals of Care: No Order  Full        Critical secondary to Patient has a condition that poses threat to life and bodily function: GI bleed     Critical care was time spent personally by me on the following activities: development of treatment plan with patient or surrogate and bedside caregivers, discussions with consultants, evaluation of patient's response to treatment, examination of patient, ordering and performing treatments and interventions, ordering and review of laboratory studies, ordering and review of radiographic studies, pulse oximetry, re-evaluation of patient's condition. This critical care time did not overlap with that of any other provider or involve time for any procedures.     Mal Parra MD  Critical Care Medicine  [facility]

## 2019-07-22 NOTE — CONSULTS
[facility]  Gastroenterology  Consult Note    Patient Name: Abdulkadir Gaines  MRN: [id number]  Admission Date: 7/22/2019  Hospital Length of Stay: 0 days  Code Status: No Order   Attending Provider: Ronnie Duran MD   Consulting Provider: Kina Jo MD  Primary Care Physician: June Rodriguez NP  Principal Problem:GI bleed    Inpatient consult to Gastroenterology  Consult performed by: Kina Jo MD  Consult ordered by: Mal Parra MD        Subjective:     HPI:  59 year old male with a history of HCV/Alcohol Cirrhosis on who GI is being consulted for for hematemesis.    Per patient he began drinking again and between Friday/Saturday he drank 1 case of beer ( 24 beers). He stayed in bed most of Saturday until she got up due to nausea. He reported multiple episodes of emesis which were progressively bloodier. This prompted him to go to an OSH ED yesterday. There he was noted to have a hemoglobin of 5.8 down from 8.8. He was started on antibiotics/PPI/Octreotide and 2 units of blood were ordered. He was then transferred to [facility]. Since arrival he has been stable with no further hematemesis, melena, or hematochezia.     Past Medical History:   Diagnosis Date    Arthritis     Hep C w/o coma, chronic     Hypertension     Laennec's cirrhosis     Liver disease        Past Surgical History:   Procedure Laterality Date    COLONOSCOPY      COLONOSCOPY N/A 2/11/2016    Performed by [facility] MD at [facility] ENDO (4TH FLR)    ESOPHAGOGASTRODUODENOSCOPY (EGD) N/A 2/11/2016    Performed by Ale Gonzalez MD at [facility] ENDO (4TH FLR)    WRIST SURGERY         Review of patient's allergies indicates:   Allergen Reactions    Aspirin Other (See Comments)     Bleeding/Cirrhosis     Family History     Problem Relation (Age of Onset)    Cancer Father    Diabetes Mother    Heart disease Brother    Hypertension Mother        Tobacco Use    Smoking status: Current Every Day Smoker      Packs/day: 1.50     Years: 40.00     Pack years: 60.00     Types: Cigarettes    Smokeless tobacco: Never Used   Substance and Sexual Activity    Alcohol use: Yes     Alcohol/week: 7.2 oz     Types: 12 Cans of beer per week     Comment: daily    Drug use: No     Types: Marijuana    Sexual activity: Never     Review of Systems   Constitutional: Negative for activity change, appetite change, chills, diaphoresis, fatigue, fever and unexpected weight change.   HENT: Negative for trouble swallowing and voice change.    Eyes: Negative for photophobia, pain, discharge, redness, itching and visual disturbance.   Respiratory: Negative for cough and shortness of breath.    Cardiovascular: Negative for chest pain, palpitations and leg swelling.   Gastrointestinal: Positive for nausea and vomiting. Negative for abdominal distention, abdominal pain, anal bleeding, blood in stool, constipation, diarrhea and rectal pain.   Endocrine: Negative for cold intolerance, heat intolerance, polydipsia, polyphagia and polyuria.   Genitourinary: Negative for dysuria, frequency, hematuria and urgency.   Musculoskeletal: Positive for back pain. Negative for neck pain.   Skin: Negative for color change, pallor, rash and wound.   Neurological: Negative for dizziness, seizures, weakness and light-headedness.     Objective:     Vital Signs (Most Recent):  Temp: 98.4 °F (36.9 °C) (07/22/19 0300)  Pulse: 69 (07/22/19 0600)  Resp: 14 (07/22/19 0600)  BP: 136/72 (07/22/19 0600)  SpO2: (!) 94 % (07/22/19 0600) Vital Signs (24h Range):  Temp:  [98 °F (36.7 °C)-99 °F (37.2 °C)] 98.4 °F (36.9 °C)  Pulse:  [] 69  Resp:  [13-24] 14  SpO2:  [94 %-100 %] 94 %  BP: (136-171)/(72-87) 136/72     Weight: 70.2 kg (154 lb 12.2 oz) (07/22/19 0005)  Body mass index is 24.24 kg/m².      Intake/Output Summary (Last 24 hours) at 7/22/2019 0714  Last data filed at 7/22/2019 0600  Gross per 24 hour   Intake 235 ml   Output 600 ml   Net -365 ml        Lines/Drains/Airways     Peripheral Intravenous Line                 Peripheral IV - Single Lumen 07/21/19 1702 18 G Left Antecubital less than 1 day         Peripheral IV - Single Lumen 07/21/19 1720 18 G Right Upper Arm less than 1 day         Peripheral IV - Single Lumen 07/21/19 1919 20 G Right Forearm less than 1 day                Physical Exam   Constitutional: He is oriented to person, place, and time. No distress.   HENT:   Head: Normocephalic and atraumatic.   Eyes: No scleral icterus.   Cardiovascular: Normal rate and regular rhythm.   Pulmonary/Chest: Effort normal and breath sounds normal.   Abdominal: Soft. Bowel sounds are normal.   Musculoskeletal: He exhibits no edema.   Neurological: He is alert and oriented to person, place, and time.   Skin: He is not diaphoretic.   Nursing note and vitals reviewed.      Significant Labs:  CBC:   Recent Labs   Lab 07/21/19  1720 07/22/19  0024 07/22/19  0600   WBC 8.99 8.08 6.37   HGB 5.8* 7.1* 6.9*   HCT 20.2* 23.1* 22.0*   * 70* 54*     CMP:   Recent Labs   Lab 07/22/19  0024      CALCIUM 9.3   ALBUMIN 2.6*   PROT 6.3   *   K 4.2   CO2 25      BUN 21*   CREATININE 0.9   ALKPHOS 58   ALT 46*   *   BILITOT 3.2*     Coagulation:   Recent Labs   Lab 07/22/19  0024   INR 1.3*       Significant Imaging:  Imaging results within the past 24 hours have been reviewed.    Assessment/Plan:     * GI bleed  59 year old male with a history of HCV/Alcohol Cirrhosis on who GI is being consulted for for hematemesis. In the setting of cirrhosis with reported hematemesis will proceed with EGD today. Presentation could be from MV tear vs PHG vs less like an acute variceal bleed.    Recommendations:  -NPO  -Maintain IV access with 2 large bore IVs  -Intravascular resuscitation/support with IVFs   -Serial H/H's and pRBCs transfusion as indicated  -Protonix 80mg IV bolus x1 and then gtt at 8mg/hr  -Octreotide gtt at 50mcg/hr  -Ceftriaxone 1g IV Q  daily for primary SBP prophylaxis  -Discontinue all NSAIDs and Heparin products  -Please correct any coagulopathy with platelets and FFP to a goal of platelets >50K and INR <2.0  -Plan for EGD today  -Obtain US of the liver with doppler        Thank you for your consult. I will follow-up with patient. Please contact us if you have any additional questions.    Kina Jo M.D.  Gastroenterology Fellow, PGY-VI  Pager: [phone]  [facility]

## 2019-07-22 NOTE — HPI
59 year old male with a history of HCV/Alcohol Cirrhosis on who GI is being consulted for for hematemesis.    Per patient he began drinking again and between Friday/Saturday he drank 1 case of beer ( 24 beers). He stayed in bed most of Saturday until she got up due to nausea. He reported multiple episodes of emesis which were progressively bloodier. This prompted him to go to an OSH ED yesterday. There he was noted to have a hemoglobin of 5.8 down from 8.8. He was started on antibiotics/PPI/Octreotide and 2 units of blood were ordered. He was then transferred to [facility]. Since arrival he has been stable with no further hematemesis, melena, or hematochezia.

## 2019-07-22 NOTE — ASSESSMENT & PLAN NOTE
59 year old male with a history of HCV/Alcohol Cirrhosis on who GI is being consulted for for hematemesis. In the setting of cirrhosis with reported hematemesis will proceed with EGD today. Presentation could be from MV tear vs PHG vs less like an acute variceal bleed.    Recommendations:  -NPO  -Maintain IV access with 2 large bore IVs  -Intravascular resuscitation/support with IVFs   -Serial H/H's and pRBCs transfusion as indicated  -Protonix 80mg IV bolus x1 and then gtt at 8mg/hr  -Octreotide gtt at 50mcg/hr  -Ceftriaxone 1g IV Q daily for primary SBP prophylaxis  -Discontinue all NSAIDs and Heparin products  -Please correct any coagulopathy with platelets and FFP to a goal of platelets >50K and INR <2.0  -Plan for EGD today  -Obtain US of the liver with doppler

## 2019-07-22 NOTE — ASSESSMENT & PLAN NOTE
-Hep C, Alcohol abuse  -hx of variceal bleed  -Protonix drip  -Octreotide  -Rocephin QD  -Type and Screen  -GI consulted

## 2019-07-22 NOTE — ANESTHESIA PREPROCEDURE EVALUATION
07/22/2019  Abdulkadir Gaines is a 59 y.o., male.    Anesthesia Evaluation    I have reviewed the Patient Summary Reports.     I have reviewed the Medications.     Review of Systems  Anesthesia Hx:  No problems with previous Anesthesia  History of prior surgery of interest to airway management or planning: Previous anesthesia: General   Social:  Smoker, Social Alcohol Use    Hematology/Oncology:  Hematology Normal   Oncology Normal     EENT/Dental:EENT/Dental Normal   Cardiovascular:   Hypertension, well controlled    Pulmonary:  Pulmonary Normal    Renal/:  Renal/ Normal     Hepatic/GI:   GERD Hepatitis    Musculoskeletal:  Musculoskeletal Normal    Neurological:  Neurology Normal    Endocrine:  Endocrine Normal    Dermatological:  Skin Normal    Psych:   Psychiatric History          Physical Exam  General:  Well nourished    Airway/Jaw/Neck:  Airway Findings: Mouth Opening: Normal Tongue: Normal  General Airway Assessment: Adult  Mallampati: II  TM Distance: Normal, at least 6 cm  Jaw/Neck Findings:  Neck ROM: Normal ROM      Dental:  Dental Findings: In tact        Mental Status:  Mental Status Findings:  Cooperative, Alert and Oriented         Anesthesia Plan  Type of Anesthesia, risks & benefits discussed:  Anesthesia Type:  general  Patient's Preference: GA  Intra-op Monitoring Plan: standard ASA monitors  Intra-op Monitoring Plan Comments:   Post Op Pain Control Plan: multimodal analgesia  Post Op Pain Control Plan Comments:   Induction:   IV  Beta Blocker:  Patient is not currently on a Beta-Blocker (No further documentation required).       Informed Consent: Patient understands risks and agrees with Anesthesia plan.  Questions answered. Anesthesia consent signed with patient.  ASA Score: 3     Day of Surgery Review of History & Physical:  There are no significant changes.  H&P update referred to  the provider.         Ready For Surgery From Anesthesia Perspective.

## 2019-07-22 NOTE — PLAN OF CARE
Problem: Adult Inpatient Plan of Care  Goal: Plan of Care Review  No acute events throughout shift, VS and assessment per flow sheet, patient progressing towards goals as tolerated, plan of care reviewed with Abdulkadir Gaines and family, all concerns addressed, will continue to monitor.

## 2019-07-22 NOTE — PLAN OF CARE
June Rodriguez, YOBANY  [address] / [place] [address]    [facility] [place], [place] - [address]  [address]  [place] [address]  Phone: [phone] Fax: [phone]    [facility]  [address]  [place] [address]  Phone: [phone] Fax: [phone]    SNE[facility] MARINO [facility] [place] - [address]  [address]  [place] [address]  Phone: [phone] Fax: [phone]    Payor: MEDICARE / Plan: MEDICARE PART A & B / Product Type: Government /     Extended Emergency Contact Information  Primary Emergency Contact: Marva Gaines   [place]  Mobile Phone: [phone]  Relation: Sister  Secondary Emergency Contact: Elgin Colunga   [place]  Mobile Phone: [phone]  Relation: Friend    No future appointments.     07/22/19 1339   Discharge Assessment   Assessment Type Discharge Planning Assessment   Confirmed/corrected address and phone number on facesheet? Yes   Assessment information obtained from? Patient;Caregiver;Medical Record   Communicated expected length of stay with patient/caregiver no   Prior to hospitilization cognitive status: Alert/Oriented   Prior to hospitalization functional status: Independent   Current cognitive status: Alert/Oriented   Current Functional Status: Independent   Facility Arrived From: ED Transfer: [facility]    Lives With friend(s)   Able to Return to Prior Arrangements other (see comments)  (TBD)   Is patient able to care for self after discharge? Unable to determine at this time (comments)   Who are your caregiver(s) and their phone number(s)? Rosa Ang (sister) [phone], Marva Gaines (sister) [phone],  Elgin Colunga (friend) [phone]   Patient's perception of discharge disposition home or selfcare   Readmission Within the Last 30 Days no previous admission in last 30 days   Patient currently being followed by outpatient case management? No   Patient  currently receives any other outside agency services? No   Equipment Currently Used at Home none   Do you have any problems affording any of your prescribed medications? No   Is the patient taking medications as prescribed? yes   Does the patient have transportation home? Yes   Transportation Anticipated family or friend will provide   Discharge Plan A Home Health;Home with family   Discharge Plan B Home   DME Needed Upon Discharge  other (see comments)  (Needs TBD)   Patient/Family in Agreement with Plan yes       Lashanda Dalton RN, NC  Case Management-Critical Care     [phone]

## 2019-07-22 NOTE — PROGRESS NOTES
[facility] Connected to bedside monitor - cardiac monitoring and continuous pulse oximetry applied. Call bell within reach, side rails raised x 2, bed locked and in lowest position. Primary service notified of patient arrival. Pt on room air, no s/s of resp distress.  Patient in no acute distress. 1 unit of blood infusing. Will continue to monitor.

## 2019-07-23 ENCOUNTER — TELEPHONE (OUTPATIENT)
Dept: GASTROENTEROLOGY | Facility: HOSPITAL | Age: 59
End: 2019-07-23

## 2019-07-23 DIAGNOSIS — D50.9 MICROCYTIC ANEMIA: ICD-10-CM

## 2019-07-23 DIAGNOSIS — K20.80 ESOPHAGITIS, LOS ANGELES GRADE C: Primary | ICD-10-CM

## 2019-07-23 LAB
ALBUMIN SERPL BCP-MCNC: 2.7 G/DL (ref 3.5–5.2)
ALP SERPL-CCNC: 55 U/L (ref 55–135)
ALT SERPL W/O P-5'-P-CCNC: 49 U/L (ref 10–44)
ANION GAP SERPL CALC-SCNC: 8 MMOL/L (ref 8–16)
ANISOCYTOSIS BLD QL SMEAR: SLIGHT
AST SERPL-CCNC: 113 U/L (ref 10–40)
BASO STIPL BLD QL SMEAR: ABNORMAL
BASOPHILS # BLD AUTO: 0.05 K/UL (ref 0–0.2)
BASOPHILS # BLD AUTO: 0.05 K/UL (ref 0–0.2)
BASOPHILS # BLD AUTO: 0.06 K/UL (ref 0–0.2)
BASOPHILS NFR BLD: 0.8 % (ref 0–1.9)
BILIRUB SERPL-MCNC: 2.7 MG/DL (ref 0.1–1)
BUN SERPL-MCNC: 21 MG/DL (ref 6–20)
CALCIUM SERPL-MCNC: 7.8 MG/DL (ref 8.7–10.5)
CHLORIDE SERPL-SCNC: 111 MMOL/L (ref 95–110)
CO2 SERPL-SCNC: 22 MMOL/L (ref 23–29)
CREAT SERPL-MCNC: 0.9 MG/DL (ref 0.5–1.4)
DIFFERENTIAL METHOD: ABNORMAL
EOSINOPHIL # BLD AUTO: 0.1 K/UL (ref 0–0.5)
EOSINOPHIL # BLD AUTO: 0.1 K/UL (ref 0–0.5)
EOSINOPHIL # BLD AUTO: 0.2 K/UL (ref 0–0.5)
EOSINOPHIL NFR BLD: 1.2 % (ref 0–8)
EOSINOPHIL NFR BLD: 1.8 % (ref 0–8)
EOSINOPHIL NFR BLD: 3 % (ref 0–8)
ERYTHROCYTE [DISTWIDTH] IN BLOOD BY AUTOMATED COUNT: 24.1 % (ref 11.5–14.5)
ERYTHROCYTE [DISTWIDTH] IN BLOOD BY AUTOMATED COUNT: 24.4 % (ref 11.5–14.5)
ERYTHROCYTE [DISTWIDTH] IN BLOOD BY AUTOMATED COUNT: 25 % (ref 11.5–14.5)
EST. GFR  (AFRICAN AMERICAN): >60 ML/MIN/1.73 M^2
EST. GFR  (NON AFRICAN AMERICAN): >60 ML/MIN/1.73 M^2
GLUCOSE SERPL-MCNC: 90 MG/DL (ref 70–110)
HCT VFR BLD AUTO: 23.3 % (ref 40–54)
HCT VFR BLD AUTO: 24.1 % (ref 40–54)
HCT VFR BLD AUTO: 24.5 % (ref 40–54)
HGB BLD-MCNC: 7.2 G/DL (ref 14–18)
HGB BLD-MCNC: 7.3 G/DL (ref 14–18)
HGB BLD-MCNC: 7.5 G/DL (ref 14–18)
HYPOCHROMIA BLD QL SMEAR: ABNORMAL
IMM GRANULOCYTES # BLD AUTO: 0.04 K/UL (ref 0–0.04)
IMM GRANULOCYTES # BLD AUTO: 0.08 K/UL (ref 0–0.04)
IMM GRANULOCYTES # BLD AUTO: 0.09 K/UL (ref 0–0.04)
IMM GRANULOCYTES NFR BLD AUTO: 0.7 % (ref 0–0.5)
IMM GRANULOCYTES NFR BLD AUTO: 1 % (ref 0–0.5)
IMM GRANULOCYTES NFR BLD AUTO: 1.4 % (ref 0–0.5)
INR PPP: 1.3 (ref 0.8–1.2)
LYMPHOCYTES # BLD AUTO: 1.3 K/UL (ref 1–4.8)
LYMPHOCYTES # BLD AUTO: 1.4 K/UL (ref 1–4.8)
LYMPHOCYTES # BLD AUTO: 1.6 K/UL (ref 1–4.8)
LYMPHOCYTES NFR BLD: 16.1 % (ref 18–48)
LYMPHOCYTES NFR BLD: 22 % (ref 18–48)
LYMPHOCYTES NFR BLD: 25.7 % (ref 18–48)
MCH RBC QN AUTO: 23.8 PG (ref 27–31)
MCH RBC QN AUTO: 24.1 PG (ref 27–31)
MCH RBC QN AUTO: 24.2 PG (ref 27–31)
MCHC RBC AUTO-ENTMCNC: 30.3 G/DL (ref 32–36)
MCHC RBC AUTO-ENTMCNC: 30.6 G/DL (ref 32–36)
MCHC RBC AUTO-ENTMCNC: 30.9 G/DL (ref 32–36)
MCV RBC AUTO: 77 FL (ref 82–98)
MCV RBC AUTO: 79 FL (ref 82–98)
MCV RBC AUTO: 80 FL (ref 82–98)
MONOCYTES # BLD AUTO: 0.7 K/UL (ref 0.3–1)
MONOCYTES # BLD AUTO: 0.7 K/UL (ref 0.3–1)
MONOCYTES # BLD AUTO: 0.9 K/UL (ref 0.3–1)
MONOCYTES NFR BLD: 10.8 % (ref 4–15)
MONOCYTES NFR BLD: 11 % (ref 4–15)
MONOCYTES NFR BLD: 11.2 % (ref 4–15)
NEUTROPHILS # BLD AUTO: 3.6 K/UL (ref 1.8–7.7)
NEUTROPHILS # BLD AUTO: 3.9 K/UL (ref 1.8–7.7)
NEUTROPHILS # BLD AUTO: 5.5 K/UL (ref 1.8–7.7)
NEUTROPHILS NFR BLD: 59 % (ref 38–73)
NEUTROPHILS NFR BLD: 62.8 % (ref 38–73)
NEUTROPHILS NFR BLD: 69.9 % (ref 38–73)
NRBC BLD-RTO: 0 /100 WBC
NRBC BLD-RTO: 1 /100 WBC
NRBC BLD-RTO: 1 /100 WBC
PLATELET # BLD AUTO: 52 K/UL (ref 150–350)
PLATELET # BLD AUTO: 56 K/UL (ref 150–350)
PLATELET # BLD AUTO: 61 K/UL (ref 150–350)
PLATELET BLD QL SMEAR: ABNORMAL
PMV BLD AUTO: ABNORMAL FL (ref 9.2–12.9)
POLYCHROMASIA BLD QL SMEAR: ABNORMAL
POTASSIUM SERPL-SCNC: 3.4 MMOL/L (ref 3.5–5.1)
PROT SERPL-MCNC: 6.2 G/DL (ref 6–8.4)
PROTHROMBIN TIME: 12.6 SEC (ref 9–12.5)
RBC # BLD AUTO: 3.02 M/UL (ref 4.6–6.2)
RBC # BLD AUTO: 3.02 M/UL (ref 4.6–6.2)
RBC # BLD AUTO: 3.11 M/UL (ref 4.6–6.2)
SODIUM SERPL-SCNC: 141 MMOL/L (ref 136–145)
WBC # BLD AUTO: 6.1 K/UL (ref 3.9–12.7)
WBC # BLD AUTO: 6.23 K/UL (ref 3.9–12.7)
WBC # BLD AUTO: 7.82 K/UL (ref 3.9–12.7)

## 2019-07-23 PROCEDURE — 25000003 PHARM REV CODE 250: Performed by: STUDENT IN AN ORGANIZED HEALTH CARE EDUCATION/TRAINING PROGRAM

## 2019-07-23 PROCEDURE — 85025 COMPLETE CBC W/AUTO DIFF WBC: CPT

## 2019-07-23 PROCEDURE — 80053 COMPREHEN METABOLIC PANEL: CPT

## 2019-07-23 PROCEDURE — 63600175 PHARM REV CODE 636 W HCPCS: Performed by: STUDENT IN AN ORGANIZED HEALTH CARE EDUCATION/TRAINING PROGRAM

## 2019-07-23 PROCEDURE — 99233 PR SUBSEQUENT HOSPITAL CARE,LEVL III: ICD-10-PCS | Mod: GC,,, | Performed by: INTERNAL MEDICINE

## 2019-07-23 PROCEDURE — 11000001 HC ACUTE MED/SURG PRIVATE ROOM

## 2019-07-23 PROCEDURE — 36415 COLL VENOUS BLD VENIPUNCTURE: CPT

## 2019-07-23 PROCEDURE — 99233 SBSQ HOSP IP/OBS HIGH 50: CPT | Mod: GC,,, | Performed by: INTERNAL MEDICINE

## 2019-07-23 PROCEDURE — 85610 PROTHROMBIN TIME: CPT

## 2019-07-23 RX ORDER — PANTOPRAZOLE SODIUM 40 MG/1
40 TABLET, DELAYED RELEASE ORAL DAILY
Status: DISCONTINUED | OUTPATIENT
Start: 2019-07-25 | End: 2019-07-29 | Stop reason: HOSPADM

## 2019-07-23 RX ORDER — HYDRALAZINE HYDROCHLORIDE 20 MG/ML
5 INJECTION INTRAMUSCULAR; INTRAVENOUS EVERY 6 HOURS PRN
Status: DISCONTINUED | OUTPATIENT
Start: 2019-07-23 | End: 2019-07-29 | Stop reason: HOSPADM

## 2019-07-23 RX ORDER — NADOLOL 20 MG/1
20 TABLET ORAL DAILY
Status: DISCONTINUED | OUTPATIENT
Start: 2019-07-23 | End: 2019-07-29 | Stop reason: HOSPADM

## 2019-07-23 RX ORDER — PANTOPRAZOLE SODIUM 40 MG/1
40 TABLET, DELAYED RELEASE ORAL 2 TIMES DAILY
Status: COMPLETED | OUTPATIENT
Start: 2019-07-23 | End: 2019-07-24

## 2019-07-23 RX ORDER — POTASSIUM CHLORIDE 20 MEQ/15ML
20 SOLUTION ORAL ONCE
Status: COMPLETED | OUTPATIENT
Start: 2019-07-23 | End: 2019-07-23

## 2019-07-23 RX ORDER — POTASSIUM CHLORIDE 20 MEQ/15ML
40 SOLUTION ORAL ONCE
Status: COMPLETED | OUTPATIENT
Start: 2019-07-23 | End: 2019-07-23

## 2019-07-23 RX ADMIN — PANTOPRAZOLE SODIUM 40 MG: 40 TABLET, DELAYED RELEASE ORAL at 09:07

## 2019-07-23 RX ADMIN — THERA TABS 1 TABLET: TAB at 09:07

## 2019-07-23 RX ADMIN — OCTREOTIDE ACETATE 50 MCG/HR: 1000 INJECTION, SOLUTION INTRAVENOUS; SUBCUTANEOUS at 03:07

## 2019-07-23 RX ADMIN — POTASSIUM CHLORIDE 40 MEQ: 20 SOLUTION ORAL at 09:07

## 2019-07-23 RX ADMIN — CEFTRIAXONE SODIUM 1 G: 1 INJECTION, POWDER, FOR SOLUTION INTRAMUSCULAR; INTRAVENOUS at 07:07

## 2019-07-23 RX ADMIN — PANTOPRAZOLE SODIUM 40 MG: 40 TABLET, DELAYED RELEASE ORAL at 08:07

## 2019-07-23 RX ADMIN — Medication 100 MG: at 09:07

## 2019-07-23 RX ADMIN — LIDOCAINE 1 PATCH: 50 PATCH TOPICAL at 08:07

## 2019-07-23 RX ADMIN — NADOLOL 20 MG: 20 TABLET ORAL at 01:07

## 2019-07-23 RX ADMIN — POTASSIUM CHLORIDE 20 MEQ: 20 SOLUTION ORAL at 05:07

## 2019-07-23 RX ADMIN — FOLIC ACID 1 MG: 1 TABLET ORAL at 09:07

## 2019-07-23 NOTE — ASSESSMENT & PLAN NOTE
-Hep C, Alcohol abuse  -hx of variceal bleed  -Protonix PO BID  -Octreotide drip 72 hours  -Rocephin QD  -GI consulted, scoped, EGD negative. Small non-bleeding esophageal varices

## 2019-07-23 NOTE — NURSING
Pt awakened for med administration, encouraged to wake up/transfer to chair per MD mcghee. Pt AAOX4, refusing at this time. Lights on, windows opened. WCTM.

## 2019-07-23 NOTE — PLAN OF CARE
Hospital Medicine ICU Acceptance Note    Date of Admit: 7/22/2019  Date of Transfer / Stepdown: 7/23/2019  ICU team stepping patient down: MICU, NCC, or CCU   Accepting  team: JUAN    Brief History of Present Illness:      Abdulkadir Gaines is a 58 y/o male with concerns for GI bleeding with dark liquid stools. Hypotensive and drop in Hb to 5 from 8s. Given 3 units PRBC. Underwent EGD with non-bleeding varices and duodenal angioectasia. Was on protonix and octreotide gtts. No further bms at this time. Now on oral protonix. Ceftriaxone for sbp prophylaxis.         Patient has been accepted by Hospital Medicine Team JUAN, who will assume care of the patient upon arrival to the floor from the ICU. Please contact ICU team with any concerns prior to arrival. Please contact [facility] at [phone] or [phone] (please do NOT leave a voicemail) when patient arrives to the floor.

## 2019-07-23 NOTE — PROGRESS NOTES
[facility]  Critical Care Medicine  Progress Note    Patient Name: Abdulkadir Gaines  MRN: [id number]  Admission Date: 7/22/2019  Hospital Length of Stay: 1 days  Code Status: No Order  Attending Provider: Ronnie Duran MD  Primary Care Provider: June Rodriguez NP   Principal Problem: GI bleed    Subjective:     HPI:  59 year old AA male PMH hepatitis C, alcohol abuse, HTN, anemia, gastric variceal bleed who presented to [facility] with hematemesis. He states yesterday he had abdominal pain, then multiple episodes of coffee ground emesis. He was found to have hgb 5.8, FOBT+, type and screened, given 2 units of blood, protonix, octreotide, then transferred to [facility] for Gastroenterology. He state he has had this occur twice in the past. He does endorse drinking 24 pack of beer/day. He states he has not had withdrawal seizures before. Last time he drink was yesterday prior to coming to the hospital     Hospital/ICU Course:  Patient admitted to ICU on 7/22 out of concern for GI bleed. Hepatology and GI consulted on patient and he was started on IV protonix, octreotide, and received a total of 3 units of blood. Patient underwent EGD on 7/22, results of which showed small non-bleeding esophageal varices, one non-bleeding angioectasia in the duodenum; there was no clear source of bleed. Patient remained hemodynamically stable through out the night. On 7/23, patient transitioned to oral protonix BID. He remained hypertensive through his hospital stay. For this and non bleeding esophageal varices, patient was started on nadolol. Patient fit for step down on 7/23. Currently on liquid diet, orders to advance as tolerated.       Interval History/Significant Events: No acute events overnight. Patient remained hemodynamically stable. Will transition to PO protonix, advance diet and step down. No further BM this morning    Review of Systems   Constitutional: Negative for activity change.    Respiratory: Negative for cough, chest tightness, shortness of breath and wheezing.    Cardiovascular: Negative for chest pain, palpitations and leg swelling.   Gastrointestinal: Negative for abdominal pain, constipation, diarrhea, nausea and vomiting.   Musculoskeletal: Negative for arthralgias, back pain and myalgias.   Skin: Negative for color change and pallor.   Neurological: Negative for dizziness, weakness and headaches.     Objective:     Vital Signs (Most Recent):  Temp: 99.1 °F (37.3 °C) (07/23/19 1000)  Pulse: 62 (07/23/19 1000)  Resp: (!) 27 (07/23/19 1000)  BP: (!) 170/73 (07/23/19 1000)  SpO2: 99 % (07/23/19 1000) Vital Signs (24h Range):  Temp:  [98 °F (36.7 °C)-99.1 °F (37.3 °C)] 99.1 °F (37.3 °C)  Pulse:  [53-92] 62  Resp:  [9-27] 27  SpO2:  [90 %-100 %] 99 %  BP: (109-176)/() 170/73   Weight: 70.2 kg (154 lb 12.2 oz)  Body mass index is 24.24 kg/m².      Intake/Output Summary (Last 24 hours) at 7/23/2019 1204  Last data filed at 7/23/2019 1000  Gross per 24 hour   Intake 240 ml   Output 325 ml   Net -85 ml       Physical Exam  Constitutional: He is oriented to person, place, and time. He appears well-developed and well-nourished. No distress.   AA male, resting comfortably in bed, non toxic appearing    HENT:   Head: Normocephalic and atraumatic.   Eyes: Pupils are equal, round, and reactive to light. EOM are normal. Right eye exhibits no discharge. Left eye exhibits no discharge.   Neck: Normal range of motion. Neck supple.   Cardiovascular: Normal rate, regular rhythm, normal heart sounds and intact distal pulses. Exam reveals no gallop and no friction rub.   No murmur heard.  Pulmonary/Chest: Effort normal and breath sounds normal. No stridor. No respiratory distress. He has no wheezes. He has no rales.   Abdominal: Bowel sounds are normal. He exhibits no distension and no mass. There is tenderness (improved). There is no rebound and no guarding.   Musculoskeletal: Normal range of  motion. He exhibits no edema, tenderness or deformity.   Neurological: He is alert and oriented to person, place, and time. No cranial nerve deficit.   Skin: Skin is warm and dry. Capillary refill takes less than 2 seconds.   Vents:     Lines/Drains/Airways     Drain            Male External Urinary Catheter 07/22/19 2058 Medium less than 1 day          Peripheral Intravenous Line                 Peripheral IV - Single Lumen 07/21/19 1919 20 G Right Forearm 1 day         Peripheral IV - Single Lumen 07/23/19 0530 22 G Posterior;Right Hand less than 1 day              Significant Labs:    CBC/Anemia Profile:  Recent Labs   Lab 07/21/19  1811  07/22/19  1818 07/22/19  2344 07/23/19  0556   WBC  --    < > 8.83 7.82 6.23   HGB  --    < > 7.6* 7.5* 7.2*   HCT  --    < > 23.7* 24.5* 23.3*   PLT  --    < > 72* 52* 56*   MCV  --    < > 77* 79* 77*   RDW  --    < > 23.9* 24.1* 24.4*   OCCULTBLOOD Positive*  --   --   --   --     < > = values in this interval not displayed.        Chemistries:  Recent Labs   Lab 07/21/19  1720 07/22/19  0024 07/22/19  1247 07/23/19  0353   * 146*  --  141   K 4.1 4.2  --  3.4*    110  --  111*   CO2 24 25  --  22*   BUN 21* 21*  --  21*   CREATININE 0.8 0.9  --  0.9   CALCIUM 9.4 9.3  --  7.8*   ALBUMIN 2.9* 2.6*  --  2.7*   PROT 7.0 6.3  --  6.2   BILITOT 1.6* 3.2*  --  2.7*   ALKPHOS 65 58  --  55   ALT 50* 46*  --  49*   * 115*  --  113*   MG  --  1.5* 2.3  --    PHOS  --  3.9  --   --        All pertinent labs within the past 24 hours have been reviewed.    Significant Imaging:  I have reviewed all pertinent imaging results/findings within the past 24 hours.      ABG  No results for input(s): PH, PO2, PCO2, HCO3, BE in the last 168 hours.  Assessment/Plan:     Psychiatric  Alcohol abuse  -drinks 24 beers/day  -CIWA protocol iniated  -Diazepam PRN    Oncology  Anemia  -Hgb 5.8, repeat 7.2 on 7/23  -Given 2 units of PRBC  - CBC q6, can space out if hemodynamically  stable    GI  * GI bleed  -Hep C, Alcohol abuse  -hx of variceal bleed  -Protonix PO BID  -Octreotide drip 72 hours  -Rocephin QD  -GI consulted, scoped, EGD negative. Small non-bleeding esophageal varices        Critical Care Daily Checklist:    A: Awake: RASS Goal/Actual Goal: RASS Goal: 0-->alert and calm  Actual: Garcia Agitation Sedation Scale (RASS): Drowsy   B: Spontaneous Breathing Trial Performed?     C: SAT & SBT Coordinated?  N/A                      D: Delirium: CAM-ICU Overall CAM-ICU: Negative   E: Early Mobility Performed? No   F: Feeding Goal:    Status:     Current Diet Order   Procedures    Diet Adult Regular (IDDSI Level 7)      AS: Analgesia/Sedation none   T: Thromboembolic Prophylaxis none   H: HOB > 300 Yes   U: Stress Ulcer Prophylaxis (if needed) protonix    G: Glucose Control none   B: Bowel Function Stool Occurrence: 1   I: Indwelling Catheter (Lines & Pollack) Necessity Peripheral IV   D: De-escalation of Antimicrobials/Pharmacotherapies rocephin    Plan for the day/ETD Step down, watch cbc, watch HTN    Code Status:  Family/Goals of Care: No Order  n/a       Critical secondary to Patient has a condition that poses threat to life and bodily function: GI bleed      Critical care was time spent personally by me on the following activities: development of treatment plan with patient or surrogate and bedside caregivers, discussions with consultants, evaluation of patient's response to treatment, examination of patient, ordering and performing treatments and interventions, ordering and review of laboratory studies, ordering and review of radiographic studies, pulse oximetry, re-evaluation of patient's condition. This critical care time did not overlap with that of any other provider or involve time for any procedures.     Karlos Nguyen MD  Critical Care Medicine  [facility]

## 2019-07-23 NOTE — TELEPHONE ENCOUNTER
07/23/2019  10:08 AM    EGD/colon in 2 months to check for healing of esophagitis and for microcytic anemia.    Kina Jo M.D.  Gastroenterology Fellow, PGY-VI  Pager: [phone]  [facility]

## 2019-07-23 NOTE — SUBJECTIVE & OBJECTIVE
Interval History/Significant Events: No acute events overnight. Patient remained hemodynamically stable. Will transition to PO protonix, advance diet and step down. No further BM this morning    Review of Systems   Constitutional: Negative for activity change.   Respiratory: Negative for cough, chest tightness, shortness of breath and wheezing.    Cardiovascular: Negative for chest pain, palpitations and leg swelling.   Gastrointestinal: Negative for abdominal pain, constipation, diarrhea, nausea and vomiting.   Musculoskeletal: Negative for arthralgias, back pain and myalgias.   Skin: Negative for color change and pallor.   Neurological: Negative for dizziness, weakness and headaches.     Objective:     Vital Signs (Most Recent):  Temp: 99.1 °F (37.3 °C) (07/23/19 1000)  Pulse: 62 (07/23/19 1000)  Resp: (!) 27 (07/23/19 1000)  BP: (!) 170/73 (07/23/19 1000)  SpO2: 99 % (07/23/19 1000) Vital Signs (24h Range):  Temp:  [98 °F (36.7 °C)-99.1 °F (37.3 °C)] 99.1 °F (37.3 °C)  Pulse:  [53-92] 62  Resp:  [9-27] 27  SpO2:  [90 %-100 %] 99 %  BP: (109-176)/() 170/73   Weight: 70.2 kg (154 lb 12.2 oz)  Body mass index is 24.24 kg/m².      Intake/Output Summary (Last 24 hours) at 7/23/2019 1204  Last data filed at 7/23/2019 1000  Gross per 24 hour   Intake 240 ml   Output 325 ml   Net -85 ml       Physical Exam  Constitutional: He is oriented to person, place, and time. He appears well-developed and well-nourished. No distress.   AA male, resting comfortably in bed, non toxic appearing    HENT:   Head: Normocephalic and atraumatic.   Eyes: Pupils are equal, round, and reactive to light. EOM are normal. Right eye exhibits no discharge. Left eye exhibits no discharge.   Neck: Normal range of motion. Neck supple.   Cardiovascular: Normal rate, regular rhythm, normal heart sounds and intact distal pulses. Exam reveals no gallop and no friction rub.   No murmur heard.  Pulmonary/Chest: Effort normal and breath sounds normal.  No stridor. No respiratory distress. He has no wheezes. He has no rales.   Abdominal: Bowel sounds are normal. He exhibits no distension and no mass. There is tenderness (improved). There is no rebound and no guarding.   Musculoskeletal: Normal range of motion. He exhibits no edema, tenderness or deformity.   Neurological: He is alert and oriented to person, place, and time. No cranial nerve deficit.   Skin: Skin is warm and dry. Capillary refill takes less than 2 seconds.   Vents:     Lines/Drains/Airways     Drain            Male External Urinary Catheter 07/22/19 2058 Medium less than 1 day          Peripheral Intravenous Line                 Peripheral IV - Single Lumen 07/21/19 1919 20 G Right Forearm 1 day         Peripheral IV - Single Lumen 07/23/19 0530 22 G Posterior;Right Hand less than 1 day              Significant Labs:    CBC/Anemia Profile:  Recent Labs   Lab 07/21/19  1811  07/22/19  1818 07/22/19  2344 07/23/19  0556   WBC  --    < > 8.83 7.82 6.23   HGB  --    < > 7.6* 7.5* 7.2*   HCT  --    < > 23.7* 24.5* 23.3*   PLT  --    < > 72* 52* 56*   MCV  --    < > 77* 79* 77*   RDW  --    < > 23.9* 24.1* 24.4*   OCCULTBLOOD Positive*  --   --   --   --     < > = values in this interval not displayed.        Chemistries:  Recent Labs   Lab 07/21/19  1720 07/22/19  0024 07/22/19  1247 07/23/19  0353   * 146*  --  141   K 4.1 4.2  --  3.4*    110  --  111*   CO2 24 25  --  22*   BUN 21* 21*  --  21*   CREATININE 0.8 0.9  --  0.9   CALCIUM 9.4 9.3  --  7.8*   ALBUMIN 2.9* 2.6*  --  2.7*   PROT 7.0 6.3  --  6.2   BILITOT 1.6* 3.2*  --  2.7*   ALKPHOS 65 58  --  55   ALT 50* 46*  --  49*   * 115*  --  113*   MG  --  1.5* 2.3  --    PHOS  --  3.9  --   --        All pertinent labs within the past 24 hours have been reviewed.    Significant Imaging:  I have reviewed all pertinent imaging results/findings within the past 24 hours.

## 2019-07-23 NOTE — ANESTHESIA POSTPROCEDURE EVALUATION
Anesthesia Post Evaluation    Patient: Abdulkadir Gaines    Procedure(s) Performed: Procedure(s) (LRB):  EGD (ESOPHAGOGASTRODUODENOSCOPY) (N/A)    Final Anesthesia Type: general  Patient location during evaluation: ICU  Patient participation: Yes- Able to Participate  Level of consciousness: awake and alert  Post-procedure vital signs: reviewed and stable  Pain management: adequate  Airway patency: patent  PONV status at discharge: No PONV  Anesthetic complications: no      Cardiovascular status: blood pressure returned to baseline  Respiratory status: unassisted  Hydration status: euvolemic  Follow-up not needed.          Vitals Value Taken Time   /62 7/23/2019  3:02 PM   Temp 36.9 °C (98.5 °F) 7/23/2019 11:00 AM   Pulse 64 7/23/2019  4:01 PM   Resp 16 7/23/2019  4:01 PM   SpO2 99 % 7/23/2019  3:59 PM   Vitals shown include unvalidated device data.      No case tracking events are documented in the log.      Pain/Ant Score: Pain Rating Prior to Med Admin: 6 (7/22/2019 11:30 PM)

## 2019-07-23 NOTE — TREATMENT PLAN
GI Treatment Plan  07/23/2019  7:23 AM    Chart reviewed and patient seen. Patient s/p EGD yesterday with no hematemesis or melena overnight. VS and hemoglobin stable.    Recommendations:  --Advance diet as tolerated    --Daily CMP, CBC, and INR    --Monitor for overt signs of bleeding    --Complete 72 hours of octreotide    --Complete 5 days of antibiotics (can be switched to PO on D/C)    --Continue PPI (can be switched to Protonix 40 mg PO QDaily on D/C)    --Needs repeat EGD in 2 months will also add colonoscopy (order placed)    --Patient needs to abstain from drinking and also needs to re-establish care with hepatology as an outpatient    --We thank you for this consultation, we will sign off at this time, please call back with any additional questions or concerns    Kina Jo M.D.  Gastroenterology Fellow, PGY-VI  Pager: [phone]  [facility]

## 2019-07-23 NOTE — ASSESSMENT & PLAN NOTE
-Hgb 5.8, repeat 7.2 on 7/23  -Given 2 units of PRBC  - CBC q6, can space out if hemodynamically stable

## 2019-07-23 NOTE — HOSPITAL COURSE
Patient admitted to ICU on 7/22 out of concern for GI bleed. Hepatology and GI consulted on patient and he was started on IV protonix, octreotide, and received a total of 3 units of blood. Patient underwent EGD on 7/22, results of which showed small non-bleeding esophageal varices, one non-bleeding angioectasia in the duodenum; there was no clear source of bleed. Patient remained hemodynamically stable through out the night. On 7/23, patient transitioned to oral protonix BID. He remained hypertensive through his hospital stay. For this and non bleeding esophageal varices, patient was started on nadolol. Patient fit for step down on 7/23. Currently on liquid diet, orders to advance as tolerated.

## 2019-07-23 NOTE — PLAN OF CARE
Problem: Adult Inpatient Plan of Care  Goal: Plan of Care Review  Outcome: Ongoing (interventions implemented as appropriate)    No acute events throughout day. See vital signs and assessments in flowsheets. See below for updates on today's progress.     Pulmonary: sats WNL 4L NC    Cardiovascular: BP stable, SB-SR; afebrile     Neurological: pt. Painfully agitated for the entirety of shift despite PRN valium, ativan and percocet admin     Gastrointestinal: no BM; clear liquid diet     Genitourinary: condom cath attempted; pt. Pulled it off     Endocrine: WNL     Skin/Bath: intact apart from the several times RN had to stick him for new IVs b/c he kept pulling them out    Date of last CHG bath given: 7/23    Infusions: sandostatin     Patient progressing towards goals as tolerated, plan of care communicated and reviewed with Abdulkadir Gaines and family. All concerns addressed. Will continue to monitor.

## 2019-07-23 NOTE — TREATMENT PLAN
GI Treatment Plan  07/22/2019  8:15 PM    EGD completed with impression and recommendations below.    Impression:    - Non-bleeding small (< 5 mm) esophageal varices.  - LA Grade C reflux esophagitis.  - Esophagogastric landmarks identified.  - Type 2 gastroesophageal varices (GOV2, esophageal varices which extend along the fundus), without bleeding.  - One non-bleeding angioectasia in the duodenum.  - No specimens collected.  - Source of bleeding is unclear at this time. There were no stigmata of bleeding found on the esophageal varices.    Recommendation:        - Observe patient in ICU for ongoing care.  - Clear liquid diet.  - Continue present medications (Octreottide gtt, PPI, and antibiotics)  - We will continue to follow alongside primary team    Kina Jo M.D.  Gastroenterology Fellow, PGY-VI  Pager: [phone]  [facility]

## 2019-07-23 NOTE — PLAN OF CARE
Handoff     Primary Team: [facility]ITICAL CARE MEDICINE Room Number: [id number]     Patient Name: Abdulkadir Gaines MRN: [id number]     Date of Birth: [id number] Allergies: Aspirin     Age: 59 y.o. Admit Date: 7/22/2019     Sex: male  BMI: Body mass index is 24.24 kg/m².     Code Status: No Order        Illness Level (current clinical status): Watcher - No    Reason for Admission: GI bleed    Brief HPI (pertinent PMH and diagnosis or differential diagnosis):   59 year old AA male PMH hepatitis C, alcohol abuse, HTN, anemia, gastric variceal bleed who presented to [facility] with hematemesis. He states yesterday he had abdominal pain, then multiple episodes of coffee ground emesis. He was found to have hgb 5.8, FOBT+, type and screened, given 2 units of blood, protonix, octreotide, then transferred to [facility] for Gastroenterology. He state he has had this occur twice in the past. He does endorse drinking 24 pack of beer/day. He states he has not had withdrawal seizures before. Last time he drink was yesterday prior to coming to the hospital.    Procedure Date: 7/22/19    Hospital Course (updated, brief assessment by system or problem, significant events):   Patient admitted to ICU on 7/22 out of concern for GI bleed. Hepatology and GI consulted on patient and he was started on IV protonix, octreotide, and received a total of 3 units of blood. Patient underwent EGD on 7/22, results of which showed small non-bleeding esophageal varices, one non-bleeding angioectasia in the duodenum; there was no clear source of bleed. Patient remained hemodynamically stable through out the night. On 7/23, patient transitioned to oral protonix BID. He remained hypertensive through his hospital stay. For this and non bleeding esophageal varices, patient was started on nadolol. Patient fit for step down on 7/23. Currently on liquid diet, orders to advance as tolerated.     Tasks (specific, using if-then statements): Continue PO  pantoprazole, octreotide for 72 hours, and rocephin for remaining doses. Control HTN.      Contingency Plan (special circumstances anticipated and plan):   If patient has additional bloody bowel movements, contact GI. If patient becomes hemodynamically unstable, transfuse if needed, reconsult ICU.     Estimated Discharge Date: 7/25    Discharge Disposition: Home or Self Care    Mentored By: Dr. Duran

## 2019-07-23 NOTE — [employer] PATIENT INSTRUCTIONS
Discharge Summary/Instructions after an Endoscopic Procedure  Patient Name: Abdulkadir Gaines  Patient MRN: [id number]  Patient YOB: 1960  Monday, July 22, 2019  Steven Cline MD  RESTRICTIONS:  During your procedure today, you received medications for sedation.  These   medications may affect your judgment, balance and coordination.  Therefore,   for 24 hours, you have the following restrictions:   - DO NOT drive a car, operate machinery, make legal/financial decisions,   sign important papers or drink alcohol.    ACTIVITY:  Today: no heavy lifting, straining or running due to procedural   sedation/anesthesia.  The following day: return to full activity including work.  DIET:  Eat and drink normally unless instructed otherwise.     TREATMENT FOR COMMON SIDE EFFECTS:  - Mild abdominal pain, nausea, belching, bloating or excessive gas:  rest,   eat lightly and use a heating pad.  - Sore Throat: treat with throat lozenges and/or gargle with warm salt   water.  - Because air was used during the procedure, expelling large amounts of air   from your rectum or belching is normal.  - If a bowel prep was taken, you may not have a bowel movement for 1-3 days.    This is normal.  SYMPTOMS TO WATCH FOR AND REPORT TO YOUR PHYSICIAN:  1. Abdominal pain or bloating, other than gas cramps.  2. Chest pain.  3. Back pain.  4. Signs of infection such as: chills or fever occurring within 24 hours   after the procedure.  5. Rectal bleeding, which would show as bright red, maroon, or black stools.   (A tablespoon of blood from the rectum is not serious, especially if   hemorrhoids are present.)  6. Vomiting.  7. Weakness or dizziness.  GO DIRECTLY TO THE NEAREST EMERGENCY ROOM IF YOU HAVE ANY OF THE FOLLOWING:      Difficulty breathing              Chills and/or fever over 101 F   Persistent vomiting and/or vomiting blood   Severe abdominal pain   Severe chest pain   Black, tarry stools   Bleeding- more than one tablespoon   Any  other symptom or condition that you feel may need urgent attention  Your doctor recommends these additional instructions:  If any biopsies were taken, your doctors clinic will contact you in 1 to 2   weeks with any results.  - Observe patient in ICU for ongoing care.   - Clear liquid diet.   - Continue present medications.   For questions, problems or results please call your physician - Steven Cline MD at Work:  [phone].  [facility], EMERGENCY ROOM PHONE NUMBER: [phone]  IF A COMPLICATION OR EMERGENCY SITUATION ARISES AND YOU ARE UNABLE TO REACH   YOUR PHYSICIAN - GO DIRECTLY TO THE EMERGENCY ROOM.  Steven Cline MD  7/22/2019 7:26:52 PM  This report has been verified and signed electronically.  [employer]

## 2019-07-24 LAB
ALBUMIN SERPL BCP-MCNC: 2.4 G/DL (ref 3.5–5.2)
ALP SERPL-CCNC: 53 U/L (ref 55–135)
ALT SERPL W/O P-5'-P-CCNC: 53 U/L (ref 10–44)
ANION GAP SERPL CALC-SCNC: 5 MMOL/L (ref 8–16)
ANISOCYTOSIS BLD QL SMEAR: ABNORMAL
AST SERPL-CCNC: 119 U/L (ref 10–40)
BASO STIPL BLD QL SMEAR: ABNORMAL
BASOPHILS # BLD AUTO: 0.04 K/UL (ref 0–0.2)
BASOPHILS NFR BLD: 0.6 % (ref 0–1.9)
BILIRUB SERPL-MCNC: 2 MG/DL (ref 0.1–1)
BUN SERPL-MCNC: 18 MG/DL (ref 6–20)
CALCIUM SERPL-MCNC: 8 MG/DL (ref 8.7–10.5)
CHLORIDE SERPL-SCNC: 108 MMOL/L (ref 95–110)
CO2 SERPL-SCNC: 24 MMOL/L (ref 23–29)
CREAT SERPL-MCNC: 1 MG/DL (ref 0.5–1.4)
DACRYOCYTES BLD QL SMEAR: ABNORMAL
DIFFERENTIAL METHOD: ABNORMAL
EOSINOPHIL # BLD AUTO: 0.3 K/UL (ref 0–0.5)
EOSINOPHIL NFR BLD: 4.2 % (ref 0–8)
ERYTHROCYTE [DISTWIDTH] IN BLOOD BY AUTOMATED COUNT: 25.9 % (ref 11.5–14.5)
EST. GFR  (AFRICAN AMERICAN): >60 ML/MIN/1.73 M^2
EST. GFR  (NON AFRICAN AMERICAN): >60 ML/MIN/1.73 M^2
GLUCOSE SERPL-MCNC: 81 MG/DL (ref 70–110)
HCT VFR BLD AUTO: 23.8 % (ref 40–54)
HGB BLD-MCNC: 7.2 G/DL (ref 14–18)
HYPOCHROMIA BLD QL SMEAR: ABNORMAL
IMM GRANULOCYTES # BLD AUTO: 0.02 K/UL (ref 0–0.04)
IMM GRANULOCYTES NFR BLD AUTO: 0.3 % (ref 0–0.5)
INR PPP: 1.3 (ref 0.8–1.2)
LYMPHOCYTES # BLD AUTO: 1.8 K/UL (ref 1–4.8)
LYMPHOCYTES NFR BLD: 29.3 % (ref 18–48)
MCH RBC QN AUTO: 24.2 PG (ref 27–31)
MCHC RBC AUTO-ENTMCNC: 30.3 G/DL (ref 32–36)
MCV RBC AUTO: 80 FL (ref 82–98)
MONOCYTES # BLD AUTO: 0.7 K/UL (ref 0.3–1)
MONOCYTES NFR BLD: 10.5 % (ref 4–15)
NEUTROPHILS # BLD AUTO: 3.4 K/UL (ref 1.8–7.7)
NEUTROPHILS NFR BLD: 55.1 % (ref 38–73)
NRBC BLD-RTO: 0 /100 WBC
OVALOCYTES BLD QL SMEAR: ABNORMAL
PLATELET # BLD AUTO: 70 K/UL (ref 150–350)
PLATELET BLD QL SMEAR: ABNORMAL
PMV BLD AUTO: ABNORMAL FL (ref 9.2–12.9)
POIKILOCYTOSIS BLD QL SMEAR: SLIGHT
POLYCHROMASIA BLD QL SMEAR: ABNORMAL
POTASSIUM SERPL-SCNC: 4 MMOL/L (ref 3.5–5.1)
PROT SERPL-MCNC: 5.7 G/DL (ref 6–8.4)
PROTHROMBIN TIME: 13.4 SEC (ref 9–12.5)
RBC # BLD AUTO: 2.97 M/UL (ref 4.6–6.2)
SODIUM SERPL-SCNC: 137 MMOL/L (ref 136–145)
TARGETS BLD QL SMEAR: ABNORMAL
WBC # BLD AUTO: 6.17 K/UL (ref 3.9–12.7)

## 2019-07-24 PROCEDURE — 36415 COLL VENOUS BLD VENIPUNCTURE: CPT

## 2019-07-24 PROCEDURE — 99232 PR SUBSEQUENT HOSPITAL CARE,LEVL II: ICD-10-PCS | Mod: ,,, | Performed by: HOSPITALIST

## 2019-07-24 PROCEDURE — 25000003 PHARM REV CODE 250: Performed by: STUDENT IN AN ORGANIZED HEALTH CARE EDUCATION/TRAINING PROGRAM

## 2019-07-24 PROCEDURE — 85610 PROTHROMBIN TIME: CPT

## 2019-07-24 PROCEDURE — 80053 COMPREHEN METABOLIC PANEL: CPT

## 2019-07-24 PROCEDURE — 11000001 HC ACUTE MED/SURG PRIVATE ROOM

## 2019-07-24 PROCEDURE — 85025 COMPLETE CBC W/AUTO DIFF WBC: CPT

## 2019-07-24 PROCEDURE — 63600175 PHARM REV CODE 636 W HCPCS: Performed by: STUDENT IN AN ORGANIZED HEALTH CARE EDUCATION/TRAINING PROGRAM

## 2019-07-24 PROCEDURE — 99232 SBSQ HOSP IP/OBS MODERATE 35: CPT | Mod: ,,, | Performed by: HOSPITALIST

## 2019-07-24 RX ADMIN — Medication 100 MG: at 09:07

## 2019-07-24 RX ADMIN — CEFTRIAXONE SODIUM 1 G: 1 INJECTION, POWDER, FOR SOLUTION INTRAMUSCULAR; INTRAVENOUS at 06:07

## 2019-07-24 RX ADMIN — PANTOPRAZOLE SODIUM 40 MG: 40 TABLET, DELAYED RELEASE ORAL at 09:07

## 2019-07-24 RX ADMIN — THERA TABS 1 TABLET: TAB at 09:07

## 2019-07-24 RX ADMIN — FOLIC ACID 1 MG: 1 TABLET ORAL at 09:07

## 2019-07-24 RX ADMIN — PANTOPRAZOLE SODIUM 40 MG: 40 TABLET, DELAYED RELEASE ORAL at 08:07

## 2019-07-24 RX ADMIN — LIDOCAINE 1 PATCH: 50 PATCH TOPICAL at 08:07

## 2019-07-24 RX ADMIN — OCTREOTIDE ACETATE 50 MCG/HR: 1000 INJECTION, SOLUTION INTRAVENOUS; SUBCUTANEOUS at 12:07

## 2019-07-24 NOTE — SUBJECTIVE & OBJECTIVE
Interval History: Stepped down from MICU after being admitted for UGIB. No further bleeding. Feels well. H&H stable in low to mid 7s.    Review of Systems   Constitutional: Negative for chills, fatigue and fever.   HENT: Negative.    Respiratory: Negative for cough and shortness of breath.    Cardiovascular: Negative for chest pain, palpitations and leg swelling.   Gastrointestinal: Negative for abdominal pain, diarrhea, nausea and vomiting.   Genitourinary: Negative for difficulty urinating, frequency and hematuria.   Musculoskeletal: Negative.    Skin: Negative for rash.   Neurological: Negative for light-headedness and headaches.   Hematological: Does not bruise/bleed easily.   Psychiatric/Behavioral: Negative for hallucinations. The patient is not nervous/anxious.      Objective:     Vital Signs (Most Recent):  Temp: 97.9 °F (36.6 °C) (07/24/19 1519)  Pulse: (!) 59 (07/24/19 1519)  Resp: 12 (07/24/19 1519)  BP: (!) 147/67 (07/24/19 1519)  SpO2: 99 % (07/24/19 1519) Vital Signs (24h Range):  Temp:  [97.8 °F (36.6 °C)-98.8 °F (37.1 °C)] 97.9 °F (36.6 °C)  Pulse:  [55-63] 59  Resp:  [12-60] 12  SpO2:  [92 %-100 %] 99 %  BP: (121-153)/(60-82) 147/67     Weight: 70.2 kg (154 lb 12.2 oz)  Body mass index is 24.24 kg/m².    Intake/Output Summary (Last 24 hours) at 7/24/2019 1532  Last data filed at 7/24/2019 1000  Gross per 24 hour   Intake 240 ml   Output 875 ml   Net -635 ml      Physical Exam   Constitutional: He is oriented to person, place, and time. No distress.   HENT:   Head: Normocephalic and atraumatic.   Eyes: No scleral icterus.   Neck: Neck supple.   Cardiovascular: Normal rate and regular rhythm.   Pulmonary/Chest: Effort normal and breath sounds normal. No respiratory distress.   Abdominal: Soft. Bowel sounds are normal. He exhibits no distension. There is no tenderness.   Musculoskeletal: He exhibits no edema.   Neurological: He is alert and oriented to person, place, and time.   Skin: No erythema.    Psychiatric: He has a normal mood and affect.   Vitals reviewed.      Significant Labs: All pertinent labs within the past 24 hours have been reviewed.    Significant Imaging: I have reviewed all pertinent imaging results/findings within the past 24 hours.

## 2019-07-24 NOTE — ASSESSMENT & PLAN NOTE
Patient stated he was able to quit drinking on his own in the past and will do so again  Declining resources at this time

## 2019-07-24 NOTE — PLAN OF CARE
Problem: Fall Injury Risk  Goal: Absence of Fall and Fall-Related Injury    Intervention: Identify and Manage Contributors to Fall Injury Risk     07/23/19 1925   Identify and Manage Contributors to Fall Injury Risk   Self-Care Promotion independence encouraged;BADL personal routines maintained;meal setup provided;BADL personal objects within reach;safe use of adaptive equipment encouraged   Manage Acute Allergic Reaction   Medication Review/Management medications reviewed   Patient alert and orientated , follows commands , bed in low position , call light within reach and patient demonstrated proper usage. Bed locked with brake , Room clutter free and personal items with reach, addressed care plan for today , all questions answered and allow patient time for clarification. Medications and diet fluid balance  instructions with signs and symptoms and the importance to continue once discharged .Reviewed discharged , next  Follow up appointment.       Comments: Patient alert and orientated , follows commands , bed in low position , call light within reach and patient demonstrated proper usage. Bed locked with brake , Room clutter free and personal items with reach, addressed care plan for today , all questions answered and allow patient time for clarification. Medications and diet fluid balance  instructions with signs and symptoms and the importance to continue once discharged .Reviewed discharged , next  Follow up appointment.

## 2019-07-24 NOTE — ASSESSMENT & PLAN NOTE
Patient presented to [facility] with hematemesis and transferred to [facility]   EGD on 7/22:  Non-bleeding small (< 5 mm) esophageal varices.                        - LA Grade C reflux esophagitis.                        - Esophagogastric landmarks identified.                        - Type 2 gastroesophageal varices (GOV2, esophageal                         varices which extend along the fundus), without                         bleeding.                        - One non-bleeding angioectasia in the duodenum.    Continue PPI, octreotide, and ceftriaxone as currently prescribed; last infusion to be completed on 7/26  To follow up with GI in 2 months for repeat EGD and colonoscopy to be done at same time; GI to schedule follow up  H&H in the low 7s but stable and no further bleeding   Anticipate dc once above meds have been completed

## 2019-07-24 NOTE — ASSESSMENT & PLAN NOTE
LFTs stable  Continue to monitor with daily labs  Continue thiamine, folic acid, and multivitamin  Continue nadolol

## 2019-07-24 NOTE — PROGRESS NOTES
[facility] Hospital Medicine  Progress Note    Patient Name: Abdulkadir Gaines  MRN: [id number]  Patient Class: IP- Inpatient   Admission Date: 7/22/2019  Length of Stay: 2 days  Attending Physician: Dayami Preciado MD  Primary Care Provider: June Rodriguez NP    [facility] Medicine Team: [facility] [place] Dayami Preciado MD    Subjective:     Principal Problem:GI bleed      HPI:  No notes on file    Overview/Hospital Course:  No notes on file    Interval History: Stepped down from MICU after being admitted for UGIB. No further bleeding. Feels well. H&H stable in low to mid 7s.    Review of Systems   Constitutional: Negative for chills, fatigue and fever.   HENT: Negative.    Respiratory: Negative for cough and shortness of breath.    Cardiovascular: Negative for chest pain, palpitations and leg swelling.   Gastrointestinal: Negative for abdominal pain, diarrhea, nausea and vomiting.   Genitourinary: Negative for difficulty urinating, frequency and hematuria.   Musculoskeletal: Negative.    Skin: Negative for rash.   Neurological: Negative for light-headedness and headaches.   Hematological: Does not bruise/bleed easily.   Psychiatric/Behavioral: Negative for hallucinations. The patient is not nervous/anxious.      Objective:     Vital Signs (Most Recent):  Temp: 97.9 °F (36.6 °C) (07/24/19 1519)  Pulse: (!) 59 (07/24/19 1519)  Resp: 12 (07/24/19 1519)  BP: (!) 147/67 (07/24/19 1519)  SpO2: 99 % (07/24/19 1519) Vital Signs (24h Range):  Temp:  [97.8 °F (36.6 °C)-98.8 °F (37.1 °C)] 97.9 °F (36.6 °C)  Pulse:  [55-63] 59  Resp:  [12-60] 12  SpO2:  [92 %-100 %] 99 %  BP: (121-153)/(60-82) 147/67     Weight: 70.2 kg (154 lb 12.2 oz)  Body mass index is 24.24 kg/m².    Intake/Output Summary (Last 24 hours) at 7/24/2019 1532  Last data filed at 7/24/2019 1000  Gross per 24 hour   Intake 240 ml   Output 875 ml   Net -635 ml      Physical Exam   Constitutional: He is oriented to person, place, and time. No  distress.   HENT:   Head: Normocephalic and atraumatic.   Eyes: No scleral icterus.   Neck: Neck supple.   Cardiovascular: Normal rate and regular rhythm.   Pulmonary/Chest: Effort normal and breath sounds normal. No respiratory distress.   Abdominal: Soft. Bowel sounds are normal. He exhibits no distension. There is no tenderness.   Musculoskeletal: He exhibits no edema.   Neurological: He is alert and oriented to person, place, and time.   Skin: No erythema.   Psychiatric: He has a normal mood and affect.   Vitals reviewed.      Significant Labs: All pertinent labs within the past 24 hours have been reviewed.    Significant Imaging: I have reviewed all pertinent imaging results/findings within the past 24 hours.      Assessment/Plan:      * GI bleed  Patient presented to [facility] with hematemesis and transferred to [facility]   EGD on 7/22:  Non-bleeding small (< 5 mm) esophageal varices.                        - LA Grade C reflux esophagitis.                        - Esophagogastric landmarks identified.                        - Type 2 gastroesophageal varices (GOV2, esophageal                         varices which extend along the fundus), without                         bleeding.                        - One non-bleeding angioectasia in the duodenum.    Continue PPI, octreotide, and ceftriaxone as currently prescribed; last infusion to be completed on 7/26  To follow up with GI in 2 months for repeat EGD and colonoscopy to be done at same time; GI to schedule follow up  H&H in the low 7s but stable and no further bleeding   Anticipate dc once above meds have been completed     Alcohol abuse  Patient stated he was able to quit drinking on his own in the past and will do so again  Declining resources at this time      Anemia  Per above      Alcoholic cirrhosis of liver  LFTs stable  Continue to monitor with daily labs  Continue thiamine, folic acid, and multivitamin  Continue nadolol         VTE Risk  Mitigation (From admission, onward)        Ordered     Place sequential compression device  Until discontinued      07/22/19 0837                Dayami Preciado MD  Department of Hospital Medicine   [facility]

## 2019-07-25 LAB
ALBUMIN SERPL BCP-MCNC: 2.4 G/DL (ref 3.5–5.2)
ALP SERPL-CCNC: 60 U/L (ref 55–135)
ALT SERPL W/O P-5'-P-CCNC: 60 U/L (ref 10–44)
ANION GAP SERPL CALC-SCNC: 8 MMOL/L (ref 8–16)
AST SERPL-CCNC: 118 U/L (ref 10–40)
BASOPHILS # BLD AUTO: 0.05 K/UL (ref 0–0.2)
BASOPHILS NFR BLD: 1 % (ref 0–1.9)
BILIRUB SERPL-MCNC: 1.7 MG/DL (ref 0.1–1)
BUN SERPL-MCNC: 14 MG/DL (ref 6–20)
CALCIUM SERPL-MCNC: 8 MG/DL (ref 8.7–10.5)
CHLORIDE SERPL-SCNC: 108 MMOL/L (ref 95–110)
CO2 SERPL-SCNC: 22 MMOL/L (ref 23–29)
CREAT SERPL-MCNC: 1 MG/DL (ref 0.5–1.4)
DIFFERENTIAL METHOD: ABNORMAL
EOSINOPHIL # BLD AUTO: 0.3 K/UL (ref 0–0.5)
EOSINOPHIL NFR BLD: 4.8 % (ref 0–8)
ERYTHROCYTE [DISTWIDTH] IN BLOOD BY AUTOMATED COUNT: 26.3 % (ref 11.5–14.5)
EST. GFR  (AFRICAN AMERICAN): >60 ML/MIN/1.73 M^2
EST. GFR  (NON AFRICAN AMERICAN): >60 ML/MIN/1.73 M^2
GLUCOSE SERPL-MCNC: 93 MG/DL (ref 70–110)
HCT VFR BLD AUTO: 23.6 % (ref 40–54)
HGB BLD-MCNC: 7.2 G/DL (ref 14–18)
IMM GRANULOCYTES # BLD AUTO: 0.03 K/UL (ref 0–0.04)
IMM GRANULOCYTES NFR BLD AUTO: 0.6 % (ref 0–0.5)
INR PPP: 1.3 (ref 0.8–1.2)
LYMPHOCYTES # BLD AUTO: 1.6 K/UL (ref 1–4.8)
LYMPHOCYTES NFR BLD: 29.8 % (ref 18–48)
MAGNESIUM SERPL-MCNC: 1.5 MG/DL (ref 1.6–2.6)
MCH RBC QN AUTO: 24.5 PG (ref 27–31)
MCHC RBC AUTO-ENTMCNC: 30.5 G/DL (ref 32–36)
MCV RBC AUTO: 80 FL (ref 82–98)
MONOCYTES # BLD AUTO: 0.8 K/UL (ref 0.3–1)
MONOCYTES NFR BLD: 14.8 % (ref 4–15)
NEUTROPHILS # BLD AUTO: 2.6 K/UL (ref 1.8–7.7)
NEUTROPHILS NFR BLD: 49 % (ref 38–73)
NRBC BLD-RTO: 0 /100 WBC
PHOSPHATE SERPL-MCNC: 3.2 MG/DL (ref 2.7–4.5)
PLATELET # BLD AUTO: 63 K/UL (ref 150–350)
PMV BLD AUTO: ABNORMAL FL (ref 9.2–12.9)
POTASSIUM SERPL-SCNC: 3.8 MMOL/L (ref 3.5–5.1)
PROT SERPL-MCNC: 5.7 G/DL (ref 6–8.4)
PROTHROMBIN TIME: 12.8 SEC (ref 9–12.5)
RBC # BLD AUTO: 2.94 M/UL (ref 4.6–6.2)
SODIUM SERPL-SCNC: 138 MMOL/L (ref 136–145)
WBC # BLD AUTO: 5.21 K/UL (ref 3.9–12.7)

## 2019-07-25 PROCEDURE — 36415 COLL VENOUS BLD VENIPUNCTURE: CPT

## 2019-07-25 PROCEDURE — 63600175 PHARM REV CODE 636 W HCPCS: Performed by: HOSPITALIST

## 2019-07-25 PROCEDURE — 85610 PROTHROMBIN TIME: CPT

## 2019-07-25 PROCEDURE — 25000003 PHARM REV CODE 250: Performed by: STUDENT IN AN ORGANIZED HEALTH CARE EDUCATION/TRAINING PROGRAM

## 2019-07-25 PROCEDURE — 11000001 HC ACUTE MED/SURG PRIVATE ROOM

## 2019-07-25 PROCEDURE — 84100 ASSAY OF PHOSPHORUS: CPT

## 2019-07-25 PROCEDURE — 85025 COMPLETE CBC W/AUTO DIFF WBC: CPT

## 2019-07-25 PROCEDURE — 80053 COMPREHEN METABOLIC PANEL: CPT

## 2019-07-25 PROCEDURE — 99232 PR SUBSEQUENT HOSPITAL CARE,LEVL II: ICD-10-PCS | Mod: ,,, | Performed by: HOSPITALIST

## 2019-07-25 PROCEDURE — 97166 OT EVAL MOD COMPLEX 45 MIN: CPT

## 2019-07-25 PROCEDURE — 97161 PT EVAL LOW COMPLEX 20 MIN: CPT

## 2019-07-25 PROCEDURE — 99232 SBSQ HOSP IP/OBS MODERATE 35: CPT | Mod: ,,, | Performed by: HOSPITALIST

## 2019-07-25 PROCEDURE — 83735 ASSAY OF MAGNESIUM: CPT

## 2019-07-25 PROCEDURE — 63600175 PHARM REV CODE 636 W HCPCS: Performed by: STUDENT IN AN ORGANIZED HEALTH CARE EDUCATION/TRAINING PROGRAM

## 2019-07-25 RX ORDER — MAGNESIUM SULFATE HEPTAHYDRATE 40 MG/ML
2 INJECTION, SOLUTION INTRAVENOUS ONCE
Status: COMPLETED | OUTPATIENT
Start: 2019-07-25 | End: 2019-07-25

## 2019-07-25 RX ADMIN — NADOLOL 20 MG: 20 TABLET ORAL at 09:07

## 2019-07-25 RX ADMIN — MAGNESIUM SULFATE IN WATER 2 G: 40 INJECTION, SOLUTION INTRAVENOUS at 09:07

## 2019-07-25 RX ADMIN — Medication 100 MG: at 09:07

## 2019-07-25 RX ADMIN — LIDOCAINE 1 PATCH: 50 PATCH TOPICAL at 08:07

## 2019-07-25 RX ADMIN — FOLIC ACID 1 MG: 1 TABLET ORAL at 09:07

## 2019-07-25 RX ADMIN — THERA TABS 1 TABLET: TAB at 09:07

## 2019-07-25 RX ADMIN — CEFTRIAXONE SODIUM 1 G: 1 INJECTION, POWDER, FOR SOLUTION INTRAMUSCULAR; INTRAVENOUS at 05:07

## 2019-07-25 RX ADMIN — PANTOPRAZOLE SODIUM 40 MG: 40 TABLET, DELAYED RELEASE ORAL at 09:07

## 2019-07-25 NOTE — PLAN OF CARE
Problem: Occupational Therapy Goal  Goal: Occupational Therapy Goal  Goals to be met by: 8/25/2019     Patient will increase functional independence with ADLs by performing:    UE Dressing with Set-up Assistance.  LE Dressing with Set-up Assistance.  Grooming while standing at sink with Set-up Assistance.  Toileting from toilet with Set-up Assistance for hygiene and clothing management.   Bathing from  standing at sink with Set-up Assistance.  Toilet transfer to toilet with Modified [place].    Outcome: Ongoing (interventions implemented as appropriate)  Eval complete. Pt tolerated session well. Initiate OT POC.

## 2019-07-25 NOTE — PT/OT/SLP EVAL
"Occupational Therapy   Evaluation    Name: Abdulkadir Gaines  MRN: [id number]  Admitting Diagnosis:  GI bleed 3 Days Post-Op    Recommendations:     Discharge Recommendations: nursing facility, skilled  Discharge Equipment Recommendations:  walker, rolling  Barriers to discharge:  None    Assessment:     Abdulkadir Gaines is a 59 y.o. male with a medical diagnosis of GI bleed.  He presents with generalized weakness, poor endurance, balance issues, and mild disorientation/loss of awareness which limit his occupational performance. Performance deficits affecting function: weakness, impaired endurance, impaired self care skills, impaired functional mobilty, gait instability, impaired balance, decreased coordination, decreased upper extremity function, decreased lower extremity function.      Rehab Prognosis: Fair; patient would benefit from acute skilled OT services to address these deficits and reach maximum level of function.       Plan:     Patient to be seen 3 x/week to address the above listed problems via self-care/home management, therapeutic activities, therapeutic exercises  · Plan of Care Expires: 08/25/19  · Plan of Care Reviewed with: patient    Subjective     Chief Complaint: "I'm doing OK, just a little weak."  Patient/Family Comments/goals: Medical management and discharge.       Occupational Profile:  Living Environment: Pt lives alone in [facility] [address].  PTA driving, disabled and reports 2x falls d/t LOB  Previous level of function: Pt reports: Independent w/ ADLs/IADLs  Roles and Routines: Retired disabled .   Equipment Used at Home:  cane, straight, bath bench  Assistance upon Discharge: Only from Self.    Pain/Comfort:  · Pain Rating 1: 0/10  · Pain Rating Post-Intervention 1: 0/10    Patients cultural, spiritual, Confucianism conflicts given the current situation: no    Objective:     Communicated with: RN prior to session.  Patient found HOB elevated with peripheral IV, telemetry upon OT entry " to room.    General Precautions: Standard, fall   Orthopedic Precautions:N/A   Braces: N/A     Occupational Performance:    Bed Mobility:    · Patient completed Rolling/Turning to Left with  stand by assistance  · Patient completed Rolling/Turning to Right with stand by assistance  · Patient completed Scooting/Bridging with stand by assistance  · Patient completed Supine to Sit with stand by assistance  · Patient completed Sit to Supine with stand by assistance    Functional Mobility/Transfers:  · Patient completed Sit <> Stand Transfer with contact guard assistance  with  hand-held assist   Functional Mobility:  Pt was able to walk several steps w/ RW and, and HHA.      Activities of Daily Living:  · Lower Body Dressing: stand by assistance seated at EOB.    Cognitive/Visual Perceptual:  Cognitive/Psychosocial Skills:     -       Oriented to: Person, Place, Time and Situation   -       Follows Commands/attention:Follows one-step commands  -       Communication: clear/fluent  -       Memory: No Deficits noted  -       Safety awareness/insight to disability: impaired   -       Mood/Affect/Coping skills/emotional control: Withdrawn  Visual/Perceptual:      -Intact no glasses worn or present    Physical Exam:  Balance:    -       Grossly Fair  Dominant hand:    -       RH  Upper Extremity Range of Motion:     -       Right Upper Extremity: WFL  -       Left Upper Extremity: WFL  Upper Extremity Strength:    -       Right Upper Extremity: WFL except some weakness against gravity in shldr flex and ABD.  -       Left Upper Extremity: WFL except some weakness against gravity in shldr flex and ABD.   Strength:    -       Right Upper Extremity: WFL  -       Left Upper Extremity: WFL  Fine Motor Coordination:    -       Intact  Gross motor coordination:   WFL    AMPAC 6 Click ADL:  AMPAC Total Score: 21    Treatment & Education:  -Pt edu on OT role/POC  -Importance of OOB activity with staff assistance  -Safety during  functional t/f and mobility  - White board updated  - Multiple self care tasks/functional mobility completed-- assistance level noted above  - All questions/concerns answered within OT scope of practice    Education:    Patient left HOB elevated with all lines intact, call button in reach and RN notified    GOALS:   Multidisciplinary Problems     Occupational Therapy Goals        Problem: Occupational Therapy Goal    Goal Priority Disciplines Outcome Interventions   Occupational Therapy Goal     OT, PT/OT Ongoing (interventions implemented as appropriate)    Description:  Goals to be met by: 8/25/2019     Patient will increase functional independence with ADLs by performing:    UE Dressing with Set-up Assistance.  LE Dressing with Set-up Assistance.  Grooming while standing at sink with Set-up Assistance.  Toileting from toilet with Set-up Assistance for hygiene and clothing management.   Bathing from  standing at sink with Set-up Assistance.  Toilet transfer to toilet with Modified [place].                      History:     Past Medical History:   Diagnosis Date    Arthritis     Hep C w/o coma, chronic     Hypertension     Laennec's cirrhosis     Liver disease        Past Surgical History:   Procedure Laterality Date    COLONOSCOPY      COLONOSCOPY N/A 2/11/2016    [facility] ([facility] F[facility])    EGD (ESOPHAGOGASTRODUODENOSCOPY) N/A 7/22/2019    Performed by Steven Cline MD at [facility] (2ND FLR)    ESOPHAGOGASTRODUODENOSCOPY (EGD) N/A 2/11/2016    Performed by Ale Gonzalez MD at [facility] (4TH FLR)    WRIST SURGERY         Time Tracking:     OT Date of Treatment: 07/25/19  OT Start Time: 1017  OT Stop Time: 1030  OT Total Time (min): 13 min    Billable Minutes:Evaluation 13 mins    Clement Carrero, OT  7/25/2019

## 2019-07-25 NOTE — HPI
59 year old AA male PMH hepatitis C, alcohol abuse, HTN, anemia, gastric variceal bleed who presented to [facility] with hematemesis. He states yesterday he had abdominal pain, then multiple episodes of coffee ground emesis. He was found to have hgb 5.8, FOBT+, type and screened, given 2 units of blood, protonix, octreotide, then transferred to [facility] for Gastroenterology. He state he has had this occur twice in the past. He does endorse drinking 24 pack of beer/day. He states he has not had withdrawal seizures before. Last time he drink was yesterday prior to coming to the hospital

## 2019-07-25 NOTE — SUBJECTIVE & OBJECTIVE
Interval History: Stepped down from MICU after being admitted for UGIB. No further bleeding. Feels well. H&H stable in low to mid 7s. No more bleeding episodes.     Review of Systems   Constitutional: Negative for chills, fatigue and fever.   HENT: Negative.    Respiratory: Negative for cough and shortness of breath.    Cardiovascular: Negative for chest pain, palpitations and leg swelling.   Gastrointestinal: Negative for abdominal pain, diarrhea, nausea and vomiting.   Genitourinary: Negative for difficulty urinating, frequency and hematuria.   Musculoskeletal: Negative.    Skin: Negative for rash.   Neurological: Negative for light-headedness and headaches.   Hematological: Does not bruise/bleed easily.   Psychiatric/Behavioral: Negative for hallucinations. The patient is not nervous/anxious.      Objective:     Vital Signs (Most Recent):  Temp: 98.5 °F (36.9 °C) (07/25/19 0329)  Pulse: 60 (07/25/19 0329)  Resp: 18 (07/25/19 0329)  BP: (!) 140/67 (07/25/19 0329)  SpO2: 98 % (07/25/19 0329) Vital Signs (24h Range):  Temp:  [97.9 °F (36.6 °C)-98.7 °F (37.1 °C)] 98.5 °F (36.9 °C)  Pulse:  [55-62] 60  Resp:  [12-18] 18  SpO2:  [96 %-99 %] 98 %  BP: (121-147)/(62-67) 140/67     Weight: 70.2 kg (154 lb 12.2 oz)  Body mass index is 24.24 kg/m².    Intake/Output Summary (Last 24 hours) at 7/25/2019 0924  Last data filed at 7/25/2019 0500  Gross per 24 hour   Intake --   Output 1150 ml   Net -1150 ml      Physical Exam   Constitutional: He is oriented to person, place, and time. No distress.   HENT:   Head: Normocephalic and atraumatic.   Eyes: No scleral icterus.   Neck: Neck supple.   Cardiovascular: Normal rate and regular rhythm.   Pulmonary/Chest: Effort normal and breath sounds normal. No respiratory distress.   Abdominal: Soft. Bowel sounds are normal. He exhibits no distension. There is no tenderness.   Musculoskeletal: He exhibits no edema.   Neurological: He is alert and oriented to person, place, and time.    Skin: No erythema.   Psychiatric: He has a normal mood and affect.   Vitals reviewed.      Significant Labs: All pertinent labs within the past 24 hours have been reviewed.    Significant Imaging: I have reviewed all pertinent imaging results/findings within the past 24 hours.

## 2019-07-25 NOTE — ASSESSMENT & PLAN NOTE
Patient presented to [facility] with hematemesis and transferred to [facility]   EGD on 7/22:  Non-bleeding small (< 5 mm) esophageal varices.                        - LA Grade C reflux esophagitis.                        - Esophagogastric landmarks identified.                        - Type 2 gastroesophageal varices (GOV2, esophageal                         varices which extend along the fundus), without                         bleeding.                        - One non-bleeding angioectasia in the duodenum.    Continue PPI, octreotide, and ceftriaxone as currently prescribed; last infusion to be completed on 7/26  To follow up with GI in 2 months for repeat EGD and colonoscopy to be done at same time; GI to schedule follow up  H&H in the low 7s but stable and no further bleeding   Anticipate dc once above meds have been completed     Lab Results   Component Value Date    HGB 7.2 (L) 07/25/2019

## 2019-07-25 NOTE — PLAN OF CARE
Problem: Adult Inpatient Plan of Care  Goal: Plan of Care Review  Outcome: Ongoing (interventions implemented as appropriate)  Patient AAOx4. Vitals WNL. Octreotide infusion discontinued. Lidocaine patch applied to mid lower back for pain. Patient able to make needs known to staff. Bed in lowest position. Call light within reach. Monitoring.

## 2019-07-25 NOTE — PLAN OF CARE
Problem: Physical Therapy Goal  Goal: Physical Therapy Goal  Goals to be met by: 2019     Patient will increase functional independence with mobility by performin. Supine to sit with Modified [place]  2. Sit to supine with Modified [place]  3. Sit to stand transfer with Supervision  4. Bed to chair transfer with Supervision using LRAD  5. Gait  x 75 feet with Stand-by Assistance using LRAD     Outcome: Ongoing (interventions implemented as appropriate)  Eval completed and POC established    Federico Hilton PT,DPT  2019

## 2019-07-25 NOTE — PROGRESS NOTES
[facility] Hospital Medicine  Progress Note    Patient Name: Abdulkadir Gaines  MRN: [id number]  Patient Class: IP- Inpatient   Admission Date: 7/22/2019  Length of Stay: 3 days  Attending Physician: Nash Preciado MD  Primary Care Provider: June Rodriguez NP    [facility] Medicine Team: [facility] HOSP MED G Nash Olmos MD    Subjective:     Principal Problem:GI bleed      HPI:  59 year old AA male PMH hepatitis C, alcohol abuse, HTN, anemia, gastric variceal bleed who presented to [facility] with hematemesis. He states yesterday he had abdominal pain, then multiple episodes of coffee ground emesis. He was found to have hgb 5.8, FOBT+, type and screened, given 2 units of blood, protonix, octreotide, then transferred to [facility] for Gastroenterology. He state he has had this occur twice in the past. He does endorse drinking 24 pack of beer/day. He states he has not had withdrawal seizures before. Last time he drink was yesterday prior to coming to the hospital      Overview/Hospital Course:  tient admitted to ICU on 7/22 out of concern for GI bleed. Hepatology and GI consulted on patient and he was started on IV protonix, octreotide, and received a total of 3 units of blood. Patient underwent EGD on 7/22, results of which showed small non-bleeding esophageal varices, one non-bleeding angioectasia in the duodenum; there was no clear source of bleed. Patient remained hemodynamically stable through out the night. On 7/23, patient transitioned to oral protonix BID. He remained hypertensive through his hospital stay. For this and non bleeding esophageal varices, patient was started on nadolol. Patient fit for step down on 7/23. Currently on liquid diet, orders to advance as tolerated.     Interval History: Stepped down from MICU after being admitted for UGIB. No further bleeding. Feels well. H&H stable in low to mid 7s. No more bleeding episodes.     Review of Systems   Constitutional: Negative for  chills, fatigue and fever.   HENT: Negative.    Respiratory: Negative for cough and shortness of breath.    Cardiovascular: Negative for chest pain, palpitations and leg swelling.   Gastrointestinal: Negative for abdominal pain, diarrhea, nausea and vomiting.   Genitourinary: Negative for difficulty urinating, frequency and hematuria.   Musculoskeletal: Negative.    Skin: Negative for rash.   Neurological: Negative for light-headedness and headaches.   Hematological: Does not bruise/bleed easily.   Psychiatric/Behavioral: Negative for hallucinations. The patient is not nervous/anxious.      Objective:     Vital Signs (Most Recent):  Temp: 98.5 °F (36.9 °C) (07/25/19 0329)  Pulse: 60 (07/25/19 0329)  Resp: 18 (07/25/19 0329)  BP: (!) 140/67 (07/25/19 0329)  SpO2: 98 % (07/25/19 0329) Vital Signs (24h Range):  Temp:  [97.9 °F (36.6 °C)-98.7 °F (37.1 °C)] 98.5 °F (36.9 °C)  Pulse:  [55-62] 60  Resp:  [12-18] 18  SpO2:  [96 %-99 %] 98 %  BP: (121-147)/(62-67) 140/67     Weight: 70.2 kg (154 lb 12.2 oz)  Body mass index is 24.24 kg/m².    Intake/Output Summary (Last 24 hours) at 7/25/2019 0924  Last data filed at 7/25/2019 0500  Gross per 24 hour   Intake --   Output 1150 ml   Net -1150 ml      Physical Exam   Constitutional: He is oriented to person, place, and time. No distress.   HENT:   Head: Normocephalic and atraumatic.   Eyes: No scleral icterus.   Neck: Neck supple.   Cardiovascular: Normal rate and regular rhythm.   Pulmonary/Chest: Effort normal and breath sounds normal. No respiratory distress.   Abdominal: Soft. Bowel sounds are normal. He exhibits no distension. There is no tenderness.   Musculoskeletal: He exhibits no edema.   Neurological: He is alert and oriented to person, place, and time.   Skin: No erythema.   Psychiatric: He has a normal mood and affect.   Vitals reviewed.      Significant Labs: All pertinent labs within the past 24 hours have been reviewed.    Significant Imaging: I have reviewed all  pertinent imaging results/findings within the past 24 hours.      Assessment/Plan:      * GI bleed  Patient presented to [facility] with hematemesis and [place] [facility]   EGD on 7/22:  Non-bleeding small (< 5 mm) esophageal varices.                        - LA Grade C reflux esophagitis.                        - Esophagogastric landmarks identified.                        - Type 2 gastroesophageal varices (GOV2, esophageal                         varices which extend along the fundus), without                         bleeding.                        - One non-bleeding angioectasia in the duodenum.    Continue PPI, octreotide, and ceftriaxone as currently prescribed; last infusion to be completed on 7/26  To follow up with GI in 2 months for repeat EGD and colonoscopy to be done at same time; GI to schedule follow up  H&H in the low 7s but stable and no further bleeding   Anticipate dc once above meds have been completed     Lab Results   Component Value Date    HGB 7.2 (L) 07/25/2019       Alcohol abuse  Patient stated he was able to quit drinking on his own in the past and will do so again  Declining resources at this time      Anemia  Per above      Alcoholic cirrhosis of liver  LFTs stable  Continue to monitor with daily labs  Continue thiamine, folic acid, and multivitamin  Continue nadolol         VTE Risk Mitigation (From admission, onward)        Ordered     Place HARITHA hose  Until discontinued      07/24/19 5210     Place sequential compression device  Until discontinued      07/22/19 0581                Nash Olmos MD  Department of Hospital Medicine   [facility]

## 2019-07-25 NOTE — HOSPITAL COURSE
brody admitted to ICU on 7/22 out of concern for GI bleed. Hepatology and GI consulted on patient and he was started on IV protonix, octreotide, and received a total of 3 units of blood. Patient underwent EGD on 7/22, results of which showed small non-bleeding esophageal varices, one non-bleeding angioectasia in the duodenum; there was no clear source of bleed. Patient remained hemodynamically stable through out the night. On 7/23, patient transitioned to oral protonix BID. He remained hypertensive through his hospital stay. For this and non bleeding esophageal varices, patient was started on nadolol. Patient fit for step down on 7/23. Currently on liquid diet, orders to advance as tolerated. stepped down to medicine and continued to do well. He completed his course of ceftriaxone and octreotide as prescribed by GI. He was evaluated by PTOT and recommended for SNF which he declined and also declined home health. He stated he was able to ambulate well and has a relative who did PTOT at a NH and she will assist him with his therapy needs.

## 2019-07-25 NOTE — PT/OT/SLP EVAL
Physical Therapy Evaluation    Patient Name:  Abdulkadir Gaines   MRN:  [id number]    Recommendations:     Discharge Recommendations:  nursing facility, skilled   Discharge Equipment Recommendations: walker, rolling   Barriers to discharge: Inaccessible home and Decreased caregiver support    Assessment:     Abdulkadir Gaines is a 59 y.o. male admitted with a medical diagnosis of GI bleed.  He presents with the following impairments/functional limitations:  weakness, impaired self care skills, impaired balance, impaired endurance, impaired functional mobilty, gait instability, decreased upper extremity function, decreased lower extremity function.  Pt tolerated session c c/o dizziness.  Performed functional mobility c SBA-min A.  Pt able to take a few steps on this date and demo unsteadiness c HHAx1 and c/o dizziness limiting further mobility.  Pt safe to transfer to/from bedside chair c assistance of 1x person.  Pt would benefit from continued skilled acute PT 3x/wk to improve functional mobility.  Recommending pt receive PT services in SNF setting following d/c from hospital once medically cleared.      Rehab Prognosis: Good; patient would benefit from acute skilled PT services to address these deficits and reach maximum level of function.    Recent Surgery: Procedure(s) (LRB):  EGD (ESOPHAGOGASTRODUODENOSCOPY) (N/A) 3 Days Post-Op    Plan:     During this hospitalization, patient to be seen 3 x/week to address the identified rehab impairments via gait training, therapeutic activities, therapeutic exercises, neuromuscular re-education and progress toward the following goals:    · Plan of Care Expires:  08/19/19    Subjective     Chief Complaint: dizziness  Patient/Family Comments/goals: to return home  Pain/Comfort:  · Pain Rating 1: 0/10    Patients cultural, spiritual, Protestant conflicts given the current situation: no    Living Environment:  Pt lives alone in [facility] c [address].  PTA driving, disabled and reports 2x  falls d/t LOB  Prior to admission, patients level of function was independent and using SPC for mobility.  Equipment used at home: cane, straight, bath bench.  DME owned (not currently used): none.  Upon discharge, patient will have assistance from self.    Objective:     Communicated with RN and OT prior to session.  Patient found HOB elevated with peripheral IV, telemetry  upon PT entry to room.    General Precautions: Standard, fall   Orthopedic Precautions:N/A   Braces: N/A     Exams:  · Cognitive Exam:  Patient is oriented to Person, Place, Time and Situation  · RLE ROM: WFL  · RLE Strength: grossly 4-/5  · LLE ROM: WFL  · LLE Strength: 4-/5    Functional Mobility:  · Bed Mobility:     · Rolling Left:  stand by assistance  · Scooting: stand by assistance  · Supine to Sit: stand by assistance  · Sit to Supine: stand by assistance  · Transfers:     · Sit to Stand:  contact guard assistance with hand-held assist  · Gait: 10ft c HHAx1 min A  · Unsteadiness, narrow TENNILLE, flat foot contact, 2x LOB  · Balance: sitting(S); standing (CGA-min A)      Therapeutic Activities and Exercises:  Pt educated on: PT role/POC; safety c mobility; benefits of OOB activities; performing therex; d/c recs - v/u      AM-PAC 6 CLICK MOBILITY  Total Score:18     Patient left HOB elevated with all lines intact, call button in reach and RN notified.    GOALS:   Multidisciplinary Problems     Physical Therapy Goals        Problem: Physical Therapy Goal    Goal Priority Disciplines Outcome Goal Variances Interventions   Physical Therapy Goal     PT, PT/OT Ongoing (interventions implemented as appropriate)     Description:  Goals to be met by: 2019     Patient will increase functional independence with mobility by performin. Supine to sit with Modified [place]  2. Sit to supine with Modified [place]  3. Sit to stand transfer with Supervision  4. Bed to chair transfer with Supervision using LRAD  5. Gait  x 75 feet with  Stand-by Assistance using LRAD                       History:     Past Medical History:   Diagnosis Date    Arthritis     Hep C w/o coma, chronic     Hypertension     Laennec's cirrhosis     Liver disease        Past Surgical History:   Procedure Laterality Date    COLONOSCOPY      COLONOSCOPY N/A 2/11/2016    Performed by Ale Gonzalez MD at [facility] (4TH FLR)    EGD (ESOPHAGOGASTRODUODENOSCOPY) N/A 7/22/2019    Performed by Steven Cline MD at [facility] ENDO (2ND FLR)    ESOPHAGOGASTRODUODENOSCOPY (EGD) N/A 2/11/2016    [facility] Ale [facility] ([facility] FLR)    WRIST SURGERY         Time Tracking:     PT Received On: 07/25/19  PT Start Time: 1017     PT Stop Time: 1030  PT Total Time (min): 13 min     Billable Minutes: Evaluation 13 min      Federico Hilton, PT  07/25/2019

## 2019-07-26 PROBLEM — R53.81 DEBILITY: Status: ACTIVE | Noted: 2019-07-26

## 2019-07-26 PROCEDURE — 99232 PR SUBSEQUENT HOSPITAL CARE,LEVL II: ICD-10-PCS | Mod: ,,, | Performed by: HOSPITALIST

## 2019-07-26 PROCEDURE — 86580 TB INTRADERMAL TEST: CPT | Performed by: HOSPITALIST

## 2019-07-26 PROCEDURE — 25000003 PHARM REV CODE 250: Performed by: STUDENT IN AN ORGANIZED HEALTH CARE EDUCATION/TRAINING PROGRAM

## 2019-07-26 PROCEDURE — 11000001 HC ACUTE MED/SURG PRIVATE ROOM

## 2019-07-26 PROCEDURE — 99232 SBSQ HOSP IP/OBS MODERATE 35: CPT | Mod: ,,, | Performed by: HOSPITALIST

## 2019-07-26 PROCEDURE — 30200315 PPD INTRADERMAL TEST REV CODE 302: Performed by: HOSPITALIST

## 2019-07-26 RX ADMIN — THERA TABS 1 TABLET: TAB at 09:07

## 2019-07-26 RX ADMIN — TUBERCULIN PURIFIED PROTEIN DERIVATIVE 5 UNITS: 5 INJECTION, SOLUTION INTRADERMAL at 10:07

## 2019-07-26 RX ADMIN — NADOLOL 20 MG: 20 TABLET ORAL at 10:07

## 2019-07-26 RX ADMIN — PANTOPRAZOLE SODIUM 40 MG: 40 TABLET, DELAYED RELEASE ORAL at 09:07

## 2019-07-26 RX ADMIN — LIDOCAINE 1 PATCH: 50 PATCH TOPICAL at 08:07

## 2019-07-26 RX ADMIN — Medication 100 MG: at 09:07

## 2019-07-26 RX ADMIN — FOLIC ACID 1 MG: 1 TABLET ORAL at 09:07

## 2019-07-26 NOTE — ASSESSMENT & PLAN NOTE
Cont with PT/OT for gait training and strengthening and restoration of ADL's   Encourage mobility, OOB in chair, and early ambulation as appropriate  Fall precautions   Monitor for bowel and bladder dysfunction  Monitor for and prevent skin breakdown and pressure ulcers  Plan for SNF placement.

## 2019-07-26 NOTE — PLAN OF CARE
07/26/19 1717   Discharge Reassessment   Assessment Type Discharge Planning Reassessment   Provided patient/caregiver education on the expected discharge date and the discharge plan Yes   Do you have any problems affording any of your prescribed medications? No   Discharge Plan A Skilled Nursing Facility   Discharge Plan B Home Health   DME Needed Upon Discharge  other (see comments)  (TBD)   Post-Acute Status   Post-Acute Authorization Placement   Post-Acute Placement Status Referrals Sent   Discharge Delays None known at this time

## 2019-07-26 NOTE — SUBJECTIVE & OBJECTIVE
Interval History: Stepped down from MICU after being admitted for UGIB. No further bleeding. Feels well. H&H stable in low to mid 7s. No more bleeding episodes.     7/26: Clinically stable. PT/OT recommending SNF. Awaiting SNF placement.     Review of Systems   Constitutional: Negative for chills, fatigue and fever.   HENT: Negative.    Respiratory: Negative for cough and shortness of breath.    Cardiovascular: Negative for chest pain, palpitations and leg swelling.   Gastrointestinal: Negative for abdominal pain, diarrhea, nausea and vomiting.   Genitourinary: Negative for difficulty urinating, frequency and hematuria.   Musculoskeletal: Negative.    Skin: Negative for rash.   Neurological: Negative for light-headedness and headaches.   Hematological: Does not bruise/bleed easily.   Psychiatric/Behavioral: Negative for hallucinations. The patient is not nervous/anxious.      Objective:     Vital Signs (Most Recent):  Temp: 98 °F (36.7 °C) (07/26/19 0736)  Pulse: 61 (07/26/19 0736)  Resp: 16 (07/26/19 0736)  BP: (!) 150/68 (07/26/19 0736)  SpO2: 95 % (07/26/19 0736) Vital Signs (24h Range):  Temp:  [98 °F (36.7 °C)-98.4 °F (36.9 °C)] 98 °F (36.7 °C)  Pulse:  [54-62] 61  Resp:  [16-18] 16  SpO2:  [95 %-99 %] 95 %  BP: (118-165)/(62-77) 150/68     Weight: 70.2 kg (154 lb 12.2 oz)  Body mass index is 24.24 kg/m².    Intake/Output Summary (Last 24 hours) at 7/26/2019 0842  Last data filed at 7/26/2019 0600  Gross per 24 hour   Intake 120 ml   Output --   Net 120 ml      Physical Exam   Constitutional: He is oriented to person, place, and time. No distress.   HENT:   Head: Normocephalic and atraumatic.   Eyes: No scleral icterus.   Neck: Neck supple.   Cardiovascular: Normal rate and regular rhythm.   Pulmonary/Chest: Effort normal and breath sounds normal. No respiratory distress.   Abdominal: Soft. Bowel sounds are normal. He exhibits no distension. There is no tenderness.   Musculoskeletal: He exhibits no edema.    Neurological: He is alert and oriented to person, place, and time.   Skin: No erythema.   Psychiatric: He has a normal mood and affect.   Vitals reviewed.      Significant Labs: All pertinent labs within the past 24 hours have been reviewed.    Significant Imaging: I have reviewed all pertinent imaging results/findings within the past 24 hours.

## 2019-07-26 NOTE — PLAN OF CARE
SNF referrals sent to [facility] SNF as per pt request.  Referrals sent via [facility].  PASSR & LOCET completed and faxed in for 142.  CRISTI will F/U.          Josephine Marie, LMSW  [phone]

## 2019-07-26 NOTE — PHYSICIAN QUERY
"PT Name: Abdulkadir Gaines  MR #: [id number]    Physician Query Form - Hematology Clarification      Fior Thapa RN, CCDS  Desk # [phone]; vannesa # [phone] [email or web address]      This form is a permanent document in the medical record.      Query Date: July 26, 2019    By submitting this query, we are merely seeking further clarification of documentation. Please utilize your independent clinical judgment when addressing the question(s) below.    The Medical record contains the following:   Indicators  Supporting Clinical Findings Location in Medical Record    "Anemia" documented Anemia H&P   x H & H = Hgb 5.8   7/21/2019 17:20 7/22/2019 06:00 7/22/2019 18:18 7/23/2019 05:56 7/24/2019 08:24 7/25/2019 06:06   Hgb 5.8 (LL) 6.9 (L) 7.6 (L) 7.2 (L) 7.2 (L) 7.2 (L)   Hct 20.2 (L) 22.0 (L) 23.7 (L) 23.3 (L) 23.8 (L) 23.6 (L)    H&P  Lab    BP =                     HR=     x "GI bleeding" documented UGIB H&P CC MD    Acute bleeding (Non GI site)     x Transfusion(s) type and screened, given 2 units of blood (then transferred to [facility] [place])  PRBC x3 on 7/23 CC MD PoC 7/23  Blood Bank    x Treatment: Recheck CBC q6 hrs  Transferred from [facility] after episode of coffee ground emesis    EGD Impression:    - Non-bleeding small (< 5 mm) esophageal varices.  - LA Grade C reflux esophagitis.  - Esophagogastric landmarks identified.  - Type 2 gastroesophageal varices (GOV2, esophageal varices which extend along the fundus), without bleeding.  - One non-bleeding angioectasia in the duodenum.  - No specimens collected.  - Source of bleeding is unclear at this time.  H&P  GI Trx Plan 7/22          GI Trx Plan 7/22   x Other:  1. UGIB  2. Polysubstance abuse  3. ETOH/HCV cirrhosis  Portal HTN w/gastric varices  H/o bleeding gastric varices  H&P CC MD      GI Trx Plan 7/22     Provider, please specify diagnosis or diagnoses associated with above clinical findings.          Please further specify "anemia"     [  x] Acute blood " loss anemia   [  ] Chronic blood loss anemia     [  ] Anemia of chronic disease ( Specify chronic disease)       [  ] Other (Specify): ___________   [  ] Clinically Undetermined     [  ] Other Hematological Diagnosis (please specify): __________     [  ] Clinically Undetermined     Please document in your progress notes daily for the duration of treatment, until resolved, and include in your discharge summary.

## 2019-07-26 NOTE — PROGRESS NOTES
[facility] Hospital Medicine  Progress Note    Patient Name: Abdulkadir Gaines  MRN: [id number]  Patient Class: IP- Inpatient   Admission Date: 7/22/2019  Length of Stay: 4 days  Attending Physician: Nahs Preciado MD  Primary Care Provider: June Rodriguez NP    [facility] Medicine Team: [facility] G Nash Olmos MD    Subjective:     Principal Problem:GI bleed      HPI:  59 year old AA male PMH hepatitis C, alcohol abuse, HTN, anemia, gastric variceal bleed who presented to [facility] with hematemesis. He states yesterday he had abdominal pain, then multiple episodes of coffee ground emesis. He was found to have hgb 5.8, FOBT+, type and screened, given 2 units of blood, protonix, octreotide, then transferred to [facility] for G[facility]enterology. He state he has had this occur twice in the past. He does endorse drinking 24 pack of beer/day. He states he has not had withdrawal seizures before. Last time he drink was yesterday prior to coming to the hospital      Overview/Hospital Course:  tient admitted to ICU on 7/22 out of concern for GI bleed. Hepatology and GI consulted on patient and he was started on IV protonix, octreotide, and received a total of 3 units of blood. Patient underwent EGD on 7/22, results of which showed small non-bleeding esophageal varices, one non-bleeding angioectasia in the duodenum; there was no clear source of bleed. Patient remained hemodynamically stable through out the night. On 7/23, patient transitioned to oral protonix BID. He remained hypertensive through his hospital stay. For this and non bleeding esophageal varices, patient was started on nadolol. Patient fit for step down on 7/23. Currently on liquid diet, orders to advance as tolerated.     Interval History: Stepped down from MICU after being admitted for UGIB. No further bleeding. Feels well. H&H stable in low to mid 7s. No more bleeding episodes.     7/26: Clinically stable. PT/OT recommending SNF.  Awaiting SNF placement.     Review of Systems   Constitutional: Negative for chills, fatigue and fever.   HENT: Negative.    Respiratory: Negative for cough and shortness of breath.    Cardiovascular: Negative for chest pain, palpitations and leg swelling.   Gastrointestinal: Negative for abdominal pain, diarrhea, nausea and vomiting.   Genitourinary: Negative for difficulty urinating, frequency and hematuria.   Musculoskeletal: Negative.    Skin: Negative for rash.   Neurological: Negative for light-headedness and headaches.   Hematological: Does not bruise/bleed easily.   Psychiatric/Behavioral: Negative for hallucinations. The patient is not nervous/anxious.      Objective:     Vital Signs (Most Recent):  Temp: 98 °F (36.7 °C) (07/26/19 0736)  Pulse: 61 (07/26/19 0736)  Resp: 16 (07/26/19 0736)  BP: (!) 150/68 (07/26/19 0736)  SpO2: 95 % (07/26/19 0736) Vital Signs (24h Range):  Temp:  [98 °F (36.7 °C)-98.4 °F (36.9 °C)] 98 °F (36.7 °C)  Pulse:  [54-62] 61  Resp:  [16-18] 16  SpO2:  [95 %-99 %] 95 %  BP: (118-165)/(62-77) 150/68     Weight: 70.2 kg (154 lb 12.2 oz)  Body mass index is 24.24 kg/m².    Intake/Output Summary (Last 24 hours) at 7/26/2019 0842  Last data filed at 7/26/2019 0600  Gross per 24 hour   Intake 120 ml   Output --   Net 120 ml      Physical Exam   Constitutional: He is oriented to person, place, and time. No distress.   HENT:   Head: Normocephalic and atraumatic.   Eyes: No scleral icterus.   Neck: Neck supple.   Cardiovascular: Normal rate and regular rhythm.   Pulmonary/Chest: Effort normal and breath sounds normal. No respiratory distress.   Abdominal: Soft. Bowel sounds are normal. He exhibits no distension. There is no tenderness.   Musculoskeletal: He exhibits no edema.   Neurological: He is alert and oriented to person, place, and time.   Skin: No erythema.   Psychiatric: He has a normal mood and affect.   Vitals reviewed.      Significant Labs: All pertinent labs within the past 24  hours have been reviewed.    Significant Imaging: I have reviewed all pertinent imaging results/findings within the past 24 hours.      Assessment/Plan:      * GI bleed  Patient presented to [facility] with [facility]em[facility] and [place] [facility]   EGD on 7/22:  Non-bleeding small (< 5 mm) esophageal varices.                        - LA Grade C reflux esophagitis.                        - Esophagogastric landmarks identified.                        - Type 2 gastroesophageal varices (GOV2, esophageal                         varices which extend along the fundus), without                         bleeding.                        - One non-bleeding angioectasia in the duodenum.    Continue PPI,   octreotide, and ceftriaxone treatment complete  To follow up with GI in 2 months for repeat EGD and colonoscopy to be done at same time; GI to schedule follow up  H&H in the low 7s but stable and no further bleeding     Lab Results   Component Value Date    HGB 7.2 (L) 07/25/2019       Debility  Cont with PT/OT for gait training and strengthening and restoration of ADL's   Encourage mobility, OOB in chair, and early ambulation as appropriate  Fall precautions   Monitor for bowel and bladder dysfunction  Monitor for and prevent skin breakdown and pressure ulcers  Plan for SNF placement.      Alcohol abuse  Patient stated he was able to quit drinking on his own in the past and will do so again  Declining resources at this time      Anemia  Per above      Alcoholic cirrhosis of liver  LFTs stable  Continue to monitor with daily labs  Continue thiamine, folic acid, and multivitamin  Continue nadolol         VTE Risk Mitigation (From admission, onward)        Ordered     Place HARITHA hose  Until discontinued      07/24/19 1571     Place sequential compression device  Until discontinued      07/22/19 0255                Nash Olmos MD  Department of Hospital Medicine   [facility]

## 2019-07-26 NOTE — ASSESSMENT & PLAN NOTE
Patient presented to [facility] with hematemesis and transferred to [facility]   EGD on 7/22:  Non-bleeding small (< 5 mm) esophageal varices.                        - LA Grade C reflux esophagitis.                        - Esophagogastric landmarks identified.                        - Type 2 gastroesophageal varices (GOV2, esophageal                         varices which extend along the fundus), without                         bleeding.                        - One non-bleeding angioectasia in the duodenum.    Continue PPI,   octreotide, and ceftriaxone treatment complete  To follow up with GI in 2 months for repeat EGD and colonoscopy to be done at same time; GI to schedule follow up  H&H in the low 7s but stable and no further bleeding     Lab Results   Component Value Date    HGB 7.2 (L) 07/25/2019

## 2019-07-26 NOTE — PLAN OF CARE
Problem: Adult Inpatient Plan of Care  Goal: Plan of Care Review  Outcome: Ongoing (interventions implemented as appropriate)  Patient AAOx4. Vitals WNL. Lidocaine patch applied to mid lower back for back pain relief. Patient able to make needs known to staff. Bed in lowest position. Call light within reach. Monitoring.

## 2019-07-26 NOTE — PLAN OF CARE
CM met with patient to discuss discharge planning. PT/OT recommending SNF at this time. Mr. Gaines stated that he is agreeable to SNF and would like referrals sent to skilled facilities near his home in [place]. SW informed of above conversation. Will continue to follow.    Leslie Grossman RN  Ext [phone]

## 2019-07-27 LAB
ANION GAP SERPL CALC-SCNC: 8 MMOL/L (ref 8–16)
ANISOCYTOSIS BLD QL SMEAR: ABNORMAL
BASOPHILS # BLD AUTO: 0.05 K/UL (ref 0–0.2)
BASOPHILS NFR BLD: 0.9 % (ref 0–1.9)
BUN SERPL-MCNC: 11 MG/DL (ref 6–20)
CALCIUM SERPL-MCNC: 8.5 MG/DL (ref 8.7–10.5)
CHLORIDE SERPL-SCNC: 109 MMOL/L (ref 95–110)
CO2 SERPL-SCNC: 19 MMOL/L (ref 23–29)
CREAT SERPL-MCNC: 0.9 MG/DL (ref 0.5–1.4)
DIFFERENTIAL METHOD: ABNORMAL
EOSINOPHIL # BLD AUTO: 0.3 K/UL (ref 0–0.5)
EOSINOPHIL NFR BLD: 4.8 % (ref 0–8)
ERYTHROCYTE [DISTWIDTH] IN BLOOD BY AUTOMATED COUNT: 27.6 % (ref 11.5–14.5)
EST. GFR  (AFRICAN AMERICAN): >60 ML/MIN/1.73 M^2
EST. GFR  (NON AFRICAN AMERICAN): >60 ML/MIN/1.73 M^2
GLUCOSE SERPL-MCNC: 102 MG/DL (ref 70–110)
HCT VFR BLD AUTO: 28.3 % (ref 40–54)
HGB BLD-MCNC: 8.6 G/DL (ref 14–18)
HYPOCHROMIA BLD QL SMEAR: ABNORMAL
IMM GRANULOCYTES # BLD AUTO: 0.02 K/UL (ref 0–0.04)
IMM GRANULOCYTES NFR BLD AUTO: 0.4 % (ref 0–0.5)
LYMPHOCYTES # BLD AUTO: 1.6 K/UL (ref 1–4.8)
LYMPHOCYTES NFR BLD: 28.5 % (ref 18–48)
MCH RBC QN AUTO: 24.5 PG (ref 27–31)
MCHC RBC AUTO-ENTMCNC: 30.4 G/DL (ref 32–36)
MCV RBC AUTO: 81 FL (ref 82–98)
MONOCYTES # BLD AUTO: 0.9 K/UL (ref 0.3–1)
MONOCYTES NFR BLD: 15.8 % (ref 4–15)
NEUTROPHILS # BLD AUTO: 2.8 K/UL (ref 1.8–7.7)
NEUTROPHILS NFR BLD: 49.6 % (ref 38–73)
NRBC BLD-RTO: 0 /100 WBC
OVALOCYTES BLD QL SMEAR: ABNORMAL
PLATELET # BLD AUTO: 67 K/UL (ref 150–350)
PLATELET BLD QL SMEAR: ABNORMAL
PMV BLD AUTO: ABNORMAL FL (ref 9.2–12.9)
POIKILOCYTOSIS BLD QL SMEAR: SLIGHT
POLYCHROMASIA BLD QL SMEAR: ABNORMAL
POTASSIUM SERPL-SCNC: 3.7 MMOL/L (ref 3.5–5.1)
RBC # BLD AUTO: 3.51 M/UL (ref 4.6–6.2)
SODIUM SERPL-SCNC: 136 MMOL/L (ref 136–145)
TARGETS BLD QL SMEAR: ABNORMAL
WBC # BLD AUTO: 5.62 K/UL (ref 3.9–12.7)

## 2019-07-27 PROCEDURE — 85025 COMPLETE CBC W/AUTO DIFF WBC: CPT

## 2019-07-27 PROCEDURE — 11000001 HC ACUTE MED/SURG PRIVATE ROOM

## 2019-07-27 PROCEDURE — 80048 BASIC METABOLIC PNL TOTAL CA: CPT

## 2019-07-27 PROCEDURE — 36415 COLL VENOUS BLD VENIPUNCTURE: CPT

## 2019-07-27 PROCEDURE — 25000003 PHARM REV CODE 250: Performed by: STUDENT IN AN ORGANIZED HEALTH CARE EDUCATION/TRAINING PROGRAM

## 2019-07-27 PROCEDURE — 99232 SBSQ HOSP IP/OBS MODERATE 35: CPT | Mod: ,,, | Performed by: HOSPITALIST

## 2019-07-27 PROCEDURE — 99232 PR SUBSEQUENT HOSPITAL CARE,LEVL II: ICD-10-PCS | Mod: ,,, | Performed by: HOSPITALIST

## 2019-07-27 RX ADMIN — PANTOPRAZOLE SODIUM 40 MG: 40 TABLET, DELAYED RELEASE ORAL at 09:07

## 2019-07-27 RX ADMIN — Medication 100 MG: at 09:07

## 2019-07-27 RX ADMIN — FOLIC ACID 1 MG: 1 TABLET ORAL at 09:07

## 2019-07-27 RX ADMIN — NADOLOL 20 MG: 20 TABLET ORAL at 09:07

## 2019-07-27 RX ADMIN — THERA TABS 1 TABLET: TAB at 09:07

## 2019-07-27 NOTE — ASSESSMENT & PLAN NOTE
Patient presented to [facility] with hematemesis and transferred to [facility]   EGD on 7/22:  Non-bleeding small (< 5 mm) esophageal varices.                        - LA Grade C reflux esophagitis.                        - Esophagogastric landmarks identified.                        - Type 2 gastroesophageal varices (GOV2, esophageal                         varices which extend along the fundus), without                         bleeding.                        - One non-bleeding angioectasia in the duodenum.    Continue PPI,   octreotide, and ceftriaxone treatment complete  To follow up with GI in 2 months for repeat EGD and colonoscopy to be done at same time; GI to schedule follow up  H&H improved and stable and no further bleeding     Lab Results   Component Value Date    HGB 8.6 (L) 07/27/2019

## 2019-07-27 NOTE — PROGRESS NOTES
[facility] Hospital Medicine  Progress Note    Patient Name: Abdulkadir Gainse  MRN: [id number]  Patient Class: IP- Inpatient   Admission Date: 7/22/2019  Length of Stay: 5 days  Attending Physician: Nash Preciado MD  Primary Care Provider: June Rodriguez NP    [facility] Medicine Team: [facility] MED G Nash Olmos MD    Subjective:     Principal Problem:GI bleed      HPI:  59 year old AA male PMH hepatitis C, alcohol abuse, HTN, anemia, gastric variceal bleed who presented to [facility] with hematemesis. He states yesterday he had abdominal pain, then multiple episodes of coffee ground emesis. He was found to have hgb 5.8, FOBT+, type and screened, given 2 units of blood, protonix, octreotide, then transferred to [facility] for Gastroenterology. He state he has had this occur twice in the past. He does endorse drinking 24 pack of beer/day. He states he has not had withdrawal seizures before. Last time he drink was yesterday prior to coming to the hospital      Overview/Hospital Course:  tient admitted to ICU on 7/22 out of concern for GI bleed. Hepatology and GI consulted on patient and he was started on IV protonix, octreotide, and received a total of 3 units of blood. Patient underwent EGD on 7/22, results of which showed small non-bleeding esophageal varices, one non-bleeding angioectasia in the duodenum; there was no clear source of bleed. Patient remained hemodynamically stable through out the night. On 7/23, patient transitioned to oral protonix BID. He remained hypertensive through his hospital stay. For this and non bleeding esophageal varices, patient was started on nadolol. Patient fit for step down on 7/23. Currently on liquid diet, orders to advance as tolerated.     Interval History: Stepped down from MICU after being admitted for UGIB. No further bleeding. Feels well. H&H stable in low to mid 7s. No more bleeding episodes.     7/27: Clinically stable. Labs improved. PT/OT  recommending SNF. Awaiting SNF placement near [place].     Review of Systems   Constitutional: Negative for chills, fatigue and fever.   HENT: Negative.    Respiratory: Negative for cough and shortness of breath.    Cardiovascular: Negative for chest pain, palpitations and leg swelling.   Gastrointestinal: Negative for abdominal pain, diarrhea, nausea and vomiting.   Genitourinary: Negative for difficulty urinating, frequency and hematuria.   Musculoskeletal: Negative.    Skin: Negative for rash.   Neurological: Negative for light-headedness and headaches.   Hematological: Does not bruise/bleed easily.   Psychiatric/Behavioral: Negative for hallucinations. The patient is not nervous/anxious.      Objective:     Vital Signs (Most Recent):  Temp: 97.3 °F (36.3 °C) (07/27/19 0809)  Pulse: (!) 18 (07/27/19 0809)  Resp: 18 (07/27/19 0809)  BP: 123/65 (07/27/19 0809)  SpO2: 99 % (07/27/19 0809) Vital Signs (24h Range):  Temp:  [97.3 °F (36.3 °C)-98.2 °F (36.8 °C)] 97.3 °F (36.3 °C)  Pulse:  [18-62] 18  Resp:  [18] 18  SpO2:  [97 %-100 %] 99 %  BP: (123-158)/(65-72) 123/65     Weight: 70.2 kg (154 lb 12.2 oz)  Body mass index is 24.24 kg/m².    Intake/Output Summary (Last 24 hours) at 7/27/2019 0954  Last data filed at 7/27/2019 0600  Gross per 24 hour   Intake 480 ml   Output 850 ml   Net -370 ml      Physical Exam   Constitutional: He is oriented to person, place, and time. No distress.   HENT:   Head: Normocephalic and atraumatic.   Eyes: No scleral icterus.   Neck: Neck supple.   Cardiovascular: Normal rate and regular rhythm.   Pulmonary/Chest: Effort normal and breath sounds normal. No respiratory distress.   Abdominal: Soft. Bowel sounds are normal. He exhibits no distension. There is no tenderness.   Musculoskeletal: He exhibits no edema.   Neurological: He is alert and oriented to person, place, and time.   Skin: No erythema.   Psychiatric: He has a normal mood and affect.   Vitals reviewed.      Significant Labs:  All pertinent labs within the past 24 hours have been reviewed.    Significant Imaging: I have reviewed all pertinent imaging results/findings within the past 24 hours.      Assessment/Plan:      * GI bleed  Patient presented to [facility] with hematemesis and transferred to [facility]   EGD on 7/22:  Non-bleeding small (< 5 mm) esophageal varices.                        - LA Grade C reflux esophagitis.                        - Esophagogastric landmarks identified.                        - Type 2 gastroesophageal varices (GOV2, esophageal                         varices which extend along the fundus), without                         bleeding.                        - One non-bleeding angioectasia in the duodenum.    Continue PPI,   octreotide, and ceftriaxone treatment complete  To follow up with GI in 2 months for repeat EGD and colonoscopy to be done at same time; GI to schedule follow up  H&H improved and stable and no further bleeding     Lab Results   Component Value Date    HGB 8.6 (L) 07/27/2019       Debility  Cont with PT/OT for gait training and strengthening and restoration of ADL's   Encourage mobility, OOB in chair, and early ambulation as appropriate  Fall precautions   Monitor for bowel and bladder dysfunction  Monitor for and prevent skin breakdown and pressure ulcers  Plan for SNF placement.      Alcohol abuse  Patient stated he was able to quit drinking on his own in the past and will do so again  Declining resources at this time      Anemia  Per above      Alcoholic cirrhosis of liver  LFTs stable  Continue to monitor with daily labs  Continue thiamine, folic acid, and multivitamin  Continue nadolol         VTE Risk Mitigation (From admission, onward)        Ordered     Place HARITHA hose  Until discontinued      07/24/19 7223     Place sequential compression device  Until discontinued      07/22/19 1171                Nash Olmos MD  Department of Hospital Medicine   [facility]

## 2019-07-27 NOTE — PLAN OF CARE
Problem: Adult Inpatient Plan of Care  Goal: Plan of Care Review  Outcome: Ongoing (interventions implemented as appropriate)  Patient turned and repositioned self independently. No skin breakdown noted. Patient pain and safety monitored q 1-2 hrs this shift. Ambulates with x1 staff assistance. Bed locked and in lowest position. Bed side rails elevated x 2. Call light and personal belongings in reach. VS q4h while awake. Will cont. to monitor.   07/27/19 0611   Plan of Care Review   Plan of Care Reviewed With patient

## 2019-07-27 NOTE — SUBJECTIVE & OBJECTIVE
Interval History: Stepped down from MICU after being admitted for UGIB. No further bleeding. Feels well. H&H stable in low to mid 7s. No more bleeding episodes.     7/27: Clinically stable. Labs improved. PT/OT recommending SNF. Awaiting SNF placement near [place].     Review of Systems   Constitutional: Negative for chills, fatigue and fever.   HENT: Negative.    Respiratory: Negative for cough and shortness of breath.    Cardiovascular: Negative for chest pain, palpitations and leg swelling.   Gastrointestinal: Negative for abdominal pain, diarrhea, nausea and vomiting.   Genitourinary: Negative for difficulty urinating, frequency and hematuria.   Musculoskeletal: Negative.    Skin: Negative for rash.   Neurological: Negative for light-headedness and headaches.   Hematological: Does not bruise/bleed easily.   Psychiatric/Behavioral: Negative for hallucinations. The patient is not nervous/anxious.      Objective:     Vital Signs (Most Recent):  Temp: 97.3 °F (36.3 °C) (07/27/19 0809)  Pulse: (!) 18 (07/27/19 0809)  Resp: 18 (07/27/19 0809)  BP: 123/65 (07/27/19 0809)  SpO2: 99 % (07/27/19 0809) Vital Signs (24h Range):  Temp:  [97.3 °F (36.3 °C)-98.2 °F (36.8 °C)] 97.3 °F (36.3 °C)  Pulse:  [18-62] 18  Resp:  [18] 18  SpO2:  [97 %-100 %] 99 %  BP: (123-158)/(65-72) 123/65     Weight: 70.2 kg (154 lb 12.2 oz)  Body mass index is 24.24 kg/m².    Intake/Output Summary (Last 24 hours) at 7/27/2019 0954  Last data filed at 7/27/2019 0600  Gross per 24 hour   Intake 480 ml   Output 850 ml   Net -370 ml      Physical Exam   Constitutional: He is oriented to person, place, and time. No distress.   HENT:   Head: Normocephalic and atraumatic.   Eyes: No scleral icterus.   Neck: Neck supple.   Cardiovascular: Normal rate and regular rhythm.   Pulmonary/Chest: Effort normal and breath sounds normal. No respiratory distress.   Abdominal: Soft. Bowel sounds are normal. He exhibits no distension. There is no tenderness.    Musculoskeletal: He exhibits no edema.   Neurological: He is alert and oriented to person, place, and time.   Skin: No erythema.   Psychiatric: He has a normal mood and affect.   Vitals reviewed.      Significant Labs: All pertinent labs within the past 24 hours have been reviewed.    Significant Imaging: I have reviewed all pertinent imaging results/findings within the past 24 hours.

## 2019-07-28 LAB
ANION GAP SERPL CALC-SCNC: 6 MMOL/L (ref 8–16)
BASOPHILS # BLD AUTO: 0.04 K/UL (ref 0–0.2)
BASOPHILS NFR BLD: 0.9 % (ref 0–1.9)
BUN SERPL-MCNC: 13 MG/DL (ref 6–20)
CALCIUM SERPL-MCNC: 7.8 MG/DL (ref 8.7–10.5)
CHLORIDE SERPL-SCNC: 109 MMOL/L (ref 95–110)
CO2 SERPL-SCNC: 22 MMOL/L (ref 23–29)
CREAT SERPL-MCNC: 0.8 MG/DL (ref 0.5–1.4)
DIFFERENTIAL METHOD: ABNORMAL
EOSINOPHIL # BLD AUTO: 0.2 K/UL (ref 0–0.5)
EOSINOPHIL NFR BLD: 4.5 % (ref 0–8)
ERYTHROCYTE [DISTWIDTH] IN BLOOD BY AUTOMATED COUNT: 26.8 % (ref 11.5–14.5)
EST. GFR  (AFRICAN AMERICAN): >60 ML/MIN/1.73 M^2
EST. GFR  (NON AFRICAN AMERICAN): >60 ML/MIN/1.73 M^2
GLUCOSE SERPL-MCNC: 88 MG/DL (ref 70–110)
HCT VFR BLD AUTO: 23.4 % (ref 40–54)
HGB BLD-MCNC: 7.1 G/DL (ref 14–18)
IMM GRANULOCYTES # BLD AUTO: 0.01 K/UL (ref 0–0.04)
IMM GRANULOCYTES NFR BLD AUTO: 0.2 % (ref 0–0.5)
LYMPHOCYTES # BLD AUTO: 1.6 K/UL (ref 1–4.8)
LYMPHOCYTES NFR BLD: 35.3 % (ref 18–48)
MCH RBC QN AUTO: 24.1 PG (ref 27–31)
MCHC RBC AUTO-ENTMCNC: 30.3 G/DL (ref 32–36)
MCV RBC AUTO: 80 FL (ref 82–98)
MONOCYTES # BLD AUTO: 0.8 K/UL (ref 0.3–1)
MONOCYTES NFR BLD: 18.3 % (ref 4–15)
NEUTROPHILS # BLD AUTO: 1.8 K/UL (ref 1.8–7.7)
NEUTROPHILS NFR BLD: 40.8 % (ref 38–73)
NRBC BLD-RTO: 0 /100 WBC
PLATELET # BLD AUTO: 62 K/UL (ref 150–350)
PMV BLD AUTO: 10.7 FL (ref 9.2–12.9)
POTASSIUM SERPL-SCNC: 3.6 MMOL/L (ref 3.5–5.1)
RBC # BLD AUTO: 2.94 M/UL (ref 4.6–6.2)
SODIUM SERPL-SCNC: 137 MMOL/L (ref 136–145)
WBC # BLD AUTO: 4.47 K/UL (ref 3.9–12.7)

## 2019-07-28 PROCEDURE — 99232 PR SUBSEQUENT HOSPITAL CARE,LEVL II: ICD-10-PCS | Mod: ,,, | Performed by: HOSPITALIST

## 2019-07-28 PROCEDURE — 25000003 PHARM REV CODE 250: Performed by: STUDENT IN AN ORGANIZED HEALTH CARE EDUCATION/TRAINING PROGRAM

## 2019-07-28 PROCEDURE — 99232 SBSQ HOSP IP/OBS MODERATE 35: CPT | Mod: ,,, | Performed by: HOSPITALIST

## 2019-07-28 PROCEDURE — 85025 COMPLETE CBC W/AUTO DIFF WBC: CPT

## 2019-07-28 PROCEDURE — 80048 BASIC METABOLIC PNL TOTAL CA: CPT

## 2019-07-28 PROCEDURE — 36415 COLL VENOUS BLD VENIPUNCTURE: CPT

## 2019-07-28 PROCEDURE — 11000001 HC ACUTE MED/SURG PRIVATE ROOM

## 2019-07-28 RX ADMIN — NADOLOL 20 MG: 20 TABLET ORAL at 10:07

## 2019-07-28 RX ADMIN — THERA TABS 1 TABLET: TAB at 10:07

## 2019-07-28 RX ADMIN — FOLIC ACID 1 MG: 1 TABLET ORAL at 10:07

## 2019-07-28 RX ADMIN — Medication 100 MG: at 09:07

## 2019-07-28 RX ADMIN — LIDOCAINE 1 PATCH: 50 PATCH TOPICAL at 08:07

## 2019-07-28 RX ADMIN — PANTOPRAZOLE SODIUM 40 MG: 40 TABLET, DELAYED RELEASE ORAL at 10:07

## 2019-07-28 NOTE — PROGRESS NOTES
[facility] Hospital Medicine  Progress Note    Patient Name: Abdulkadir Gaines  MRN: [id number]  Patient Class: IP- Inpatient   Admission Date: 7/22/2019  Length of Stay: 6 days  Attending Physician: Dayami Preciado MD  Primary Care Provider: June Rodriguez NP    [facility] Medicine Team: [facility] [place] [place] [facility]OSP MED  Dayami Preciado MD    Subjective:     Principal Problem:GI bleed      HPI:  59 year old AA male PMH hepatitis C, alcohol abuse, HTN, anemia, gastric variceal bleed who presented to [facility] with hematemesis. He states yesterday he had abdominal pain, then multiple episodes of coffee ground emesis. He was found to have hgb 5.8, FOBT+, type and screened, given 2 units of blood, protonix, octreotide, then transferred to [facility] for Gastroenterology. He state he has had this occur twice in the past. He does endorse drinking 24 pack of beer/day. He states he has not had withdrawal seizures before. Last time he drink was yesterday prior to coming to the hospital      Overview/Hospital Course:  tient admitted to ICU on 7/22 out of concern for GI bleed. Hepatology and GI consulted on patient and he was started on IV protonix, octreotide, and received a total of 3 units of blood. Patient underwent EGD on 7/22, results of which showed small non-bleeding esophageal varices, one non-bleeding angioectasia in the duodenum; there was no clear source of bleed. Patient remained hemodynamically stable through out the night. On 7/23, patient transitioned to oral protonix BID. He remained hypertensive through his hospital stay. For this and non bleeding esophageal varices, patient was started on nadolol. Patient fit for step down on 7/23. Currently on liquid diet, orders to advance as tolerated.     Interval History: No acute issues. Awaiting placement.     Review of Systems   Constitutional: Negative for chills, fatigue and fever.   HENT: Negative.    Respiratory: Negative for cough and shortness of  breath.    Cardiovascular: Negative for chest pain, palpitations and leg swelling.   Gastrointestinal: Negative for abdominal pain, diarrhea, nausea and vomiting.   Genitourinary: Negative for difficulty urinating, frequency and hematuria.   Musculoskeletal: Negative.    Skin: Negative for rash.   Neurological: Negative for light-headedness and headaches.   Hematological: Does not bruise/bleed easily.   Psychiatric/Behavioral: Negative for hallucinations. The patient is not nervous/anxious.      Objective:     Vital Signs (Most Recent):  Temp: 98 °F (36.7 °C) (07/28/19 1103)  Pulse: (!) 54 (07/28/19 1103)  Resp: 18 (07/28/19 1103)  BP: (!) 140/66 (07/28/19 1103)  SpO2: 100 % (07/28/19 1103) Vital Signs (24h Range):  Temp:  [97.9 °F (36.6 °C)-98 °F (36.7 °C)] 98 °F (36.7 °C)  Pulse:  [54-65] 54  Resp:  [18] 18  SpO2:  [99 %-100 %] 100 %  BP: (130-155)/(66-80) 140/66     Weight: 70.2 kg (154 lb 12.2 oz)  Body mass index is 24.24 kg/m².    Intake/Output Summary (Last 24 hours) at 7/28/2019 1616  Last data filed at 7/27/2019 1800  Gross per 24 hour   Intake 480 ml   Output 500 ml   Net -20 ml      Physical Exam   Constitutional: He is oriented to person, place, and time. No distress.   HENT:   Head: Normocephalic and atraumatic.   Eyes: No scleral icterus.   Neck: Neck supple.   Cardiovascular: Normal rate and regular rhythm.   Pulmonary/Chest: Effort normal and breath sounds normal. No respiratory distress.   Abdominal: Soft. Bowel sounds are normal. He exhibits no distension. There is no tenderness.   Musculoskeletal: He exhibits no edema.   Neurological: He is alert and oriented to person, place, and time.   Skin: No erythema.   Psychiatric: He has a normal mood and affect.   Vitals reviewed.      Significant Labs: All pertinent labs within the past 24 hours have been reviewed.    Significant Imaging: I have reviewed all pertinent imaging results/findings within the past 24 hours.      Assessment/Plan:      * GI  bleed  Patient presented to [facility] with hematemesis and transferred to [facility] [place] [facility]   EGD on 7/22:  Non-bleeding small (< 5 mm) esophageal varices.                        - LA Grade C reflux esophagitis.                        - Esophagogastric landmarks identified.                        - Type 2 gastroesophageal varices (GOV2, esophageal                         varices which extend along the fundus), without                         bleeding.                        - One non-bleeding angioectasia in the duodenum.    Continue PPI  octreotide, and ceftriaxone treatment complete  To follow up with GI in 2 months for repeat EGD and colonoscopy to be done at same time; GI to schedule follow up  H&H stable in the low 7s and no further bleeding     Lab Results   Component Value Date    HGB 7.1 (L) 07/28/2019       Debility  Cont with PT/OT for gait training and strengthening and restoration of ADL's   Encourage mobility, OOB in chair, and early ambulation as appropriate  Fall precautions   Monitor for bowel and bladder dysfunction  Monitor for and prevent skin breakdown and pressure ulcers  Plan for SNF placement.      Alcohol abuse  Patient stated he was able to quit drinking on his own in the past and will do so again  Declining resources at this time      Anemia  Per above      Alcoholic cirrhosis of liver  LFTs stable  Continue to monitor with daily labs  Continue thiamine, folic acid, and multivitamin  Continue nadolol         VTE Risk Mitigation (From admission, onward)        Ordered     Place HARITHA hose  Until discontinued      07/24/19 4862     Place sequential compression device  Until discontinued      07/22/19 8142                Dayami rPeciado MD  Department of Hospital Medicine   [facility]

## 2019-07-28 NOTE — ASSESSMENT & PLAN NOTE
Patient presented to [facility] with hematemesis and transferred to [facility] [place]   EGD on 7/22:  Non-bleeding small (< 5 mm) esophageal varices.                        - LA Grade C reflux esophagitis.                        - Esophagogastric landmarks identified.                        - Type 2 gastroesophageal varices (GOV2, esophageal                         varices which extend along the fundus), without                         bleeding.                        - One non-bleeding angioectasia in the duodenum.    Continue PPI  octreotide, and ceftriaxone treatment complete  To follow up with GI in 2 months for repeat EGD and colonoscopy to be done at same time; GI to schedule follow up  H&H stable in the low 7s and no further bleeding     Lab Results   Component Value Date    HGB 7.1 (L) 07/28/2019

## 2019-07-28 NOTE — PLAN OF CARE
Problem: Adult Inpatient Plan of Care  Goal: Plan of Care Review  Outcome: Ongoing (interventions implemented as appropriate)  Patient turned and repositioned self independently. No skin breakdown noted. Patient pain and safety monitored q 1-2 hrs this shift. Ambulates with x1 staff assistance. Bed locked and in lowest position. Bed side rails elevated x 2. Call light and personal belongings in reach. VS q4h while awake, asleep this AM. Will cont. to monitor.   07/28/19 0617   Plan of Care Review   Plan of Care Reviewed With patient

## 2019-07-29 VITALS
HEART RATE: 57 BPM | HEIGHT: 67 IN | DIASTOLIC BLOOD PRESSURE: 76 MMHG | RESPIRATION RATE: 22 BRPM | BODY MASS INDEX: 24.29 KG/M2 | SYSTOLIC BLOOD PRESSURE: 134 MMHG | OXYGEN SATURATION: 100 % | TEMPERATURE: 98 F | WEIGHT: 154.75 LBS

## 2019-07-29 LAB
ANION GAP SERPL CALC-SCNC: 6 MMOL/L (ref 8–16)
BASOPHILS # BLD AUTO: 0.06 K/UL (ref 0–0.2)
BASOPHILS NFR BLD: 1.3 % (ref 0–1.9)
BUN SERPL-MCNC: 12 MG/DL (ref 6–20)
CALCIUM SERPL-MCNC: 8.3 MG/DL (ref 8.7–10.5)
CHLORIDE SERPL-SCNC: 111 MMOL/L (ref 95–110)
CO2 SERPL-SCNC: 23 MMOL/L (ref 23–29)
CREAT SERPL-MCNC: 0.9 MG/DL (ref 0.5–1.4)
DIFFERENTIAL METHOD: ABNORMAL
EOSINOPHIL # BLD AUTO: 0.3 K/UL (ref 0–0.5)
EOSINOPHIL NFR BLD: 5.7 % (ref 0–8)
ERYTHROCYTE [DISTWIDTH] IN BLOOD BY AUTOMATED COUNT: 26.7 % (ref 11.5–14.5)
EST. GFR  (AFRICAN AMERICAN): >60 ML/MIN/1.73 M^2
EST. GFR  (NON AFRICAN AMERICAN): >60 ML/MIN/1.73 M^2
GLUCOSE SERPL-MCNC: 92 MG/DL (ref 70–110)
HCT VFR BLD AUTO: 25.5 % (ref 40–54)
HGB BLD-MCNC: 7.8 G/DL (ref 14–18)
IMM GRANULOCYTES # BLD AUTO: 0.01 K/UL (ref 0–0.04)
IMM GRANULOCYTES NFR BLD AUTO: 0.2 % (ref 0–0.5)
LYMPHOCYTES # BLD AUTO: 1.6 K/UL (ref 1–4.8)
LYMPHOCYTES NFR BLD: 34 % (ref 18–48)
MCH RBC QN AUTO: 24.4 PG (ref 27–31)
MCHC RBC AUTO-ENTMCNC: 30.6 G/DL (ref 32–36)
MCV RBC AUTO: 80 FL (ref 82–98)
MONOCYTES # BLD AUTO: 0.8 K/UL (ref 0.3–1)
MONOCYTES NFR BLD: 16.5 % (ref 4–15)
NEUTROPHILS # BLD AUTO: 2 K/UL (ref 1.8–7.7)
NEUTROPHILS NFR BLD: 42.3 % (ref 38–73)
NRBC BLD-RTO: 0 /100 WBC
PLATELET # BLD AUTO: 76 K/UL (ref 150–350)
PMV BLD AUTO: ABNORMAL FL (ref 9.2–12.9)
POTASSIUM SERPL-SCNC: 4 MMOL/L (ref 3.5–5.1)
RBC # BLD AUTO: 3.2 M/UL (ref 4.6–6.2)
SODIUM SERPL-SCNC: 140 MMOL/L (ref 136–145)
TB INDURATION 48 - 72 HR READ: 0 MM
WBC # BLD AUTO: 4.73 K/UL (ref 3.9–12.7)

## 2019-07-29 PROCEDURE — 36415 COLL VENOUS BLD VENIPUNCTURE: CPT

## 2019-07-29 PROCEDURE — 25000003 PHARM REV CODE 250: Performed by: STUDENT IN AN ORGANIZED HEALTH CARE EDUCATION/TRAINING PROGRAM

## 2019-07-29 PROCEDURE — 99238 HOSP IP/OBS DSCHRG MGMT 30/<: CPT | Mod: ,,, | Performed by: HOSPITALIST

## 2019-07-29 PROCEDURE — 80048 BASIC METABOLIC PNL TOTAL CA: CPT

## 2019-07-29 PROCEDURE — 97116 GAIT TRAINING THERAPY: CPT

## 2019-07-29 PROCEDURE — 85025 COMPLETE CBC W/AUTO DIFF WBC: CPT

## 2019-07-29 PROCEDURE — 99238 PR HOSPITAL DISCHARGE DAY,<30 MIN: ICD-10-PCS | Mod: ,,, | Performed by: HOSPITALIST

## 2019-07-29 RX ORDER — LANOLIN ALCOHOL/MO/W.PET/CERES
100 CREAM (GRAM) TOPICAL DAILY
Qty: 30 TABLET | Refills: 11 | Status: SHIPPED | OUTPATIENT
Start: 2019-07-29 | End: 2019-11-08 | Stop reason: SDUPTHER

## 2019-07-29 RX ORDER — FOLIC ACID 1 MG/1
1 TABLET ORAL DAILY
Qty: 30 TABLET | Refills: 11 | Status: SHIPPED | OUTPATIENT
Start: 2019-07-29 | End: 2019-11-08 | Stop reason: SDUPTHER

## 2019-07-29 RX ORDER — PANTOPRAZOLE SODIUM 40 MG/1
40 TABLET, DELAYED RELEASE ORAL DAILY
Qty: 30 TABLET | Refills: 11 | Status: SHIPPED | OUTPATIENT
Start: 2019-07-29 | End: 2019-11-08 | Stop reason: SDUPTHER

## 2019-07-29 RX ORDER — NADOLOL 20 MG/1
20 TABLET ORAL DAILY
Qty: 30 TABLET | Refills: 11 | Status: SHIPPED | OUTPATIENT
Start: 2019-07-29 | End: 2019-11-08 | Stop reason: SDUPTHER

## 2019-07-29 RX ADMIN — PANTOPRAZOLE SODIUM 40 MG: 40 TABLET, DELAYED RELEASE ORAL at 10:07

## 2019-07-29 RX ADMIN — NADOLOL 20 MG: 20 TABLET ORAL at 09:07

## 2019-07-29 RX ADMIN — THERA TABS 1 TABLET: TAB at 10:07

## 2019-07-29 RX ADMIN — FOLIC ACID 1 MG: 1 TABLET ORAL at 10:07

## 2019-07-29 RX ADMIN — Medication 100 MG: at 10:07

## 2019-07-29 NOTE — PLAN OF CARE
CM met with patient to discuss his discharge plan.  informed this CM that he would like to go home today. Home health was offered to patient but he declined. Mr. Gaines states he has a family member coming to pick him up. CRISTI and Dr. Preciado notified of above conversation. Will continue to follow.    Leslie Grossman RN  Ext [phone]

## 2019-07-29 NOTE — PLAN OF CARE
Problem: Physical Therapy Goal  Goal: Physical Therapy Goal  Goals to be met by: 2019     Patient will increase functional independence with mobility by performin. Supine to sit with Modified [place]  2. Sit to supine with Modified [place]  3. Sit to stand transfer with Supervision  4. Bed to chair transfer with Supervision using LRAD  5. Gait  x 75 feet with Stand-by Assistance using LRAD      Outcome: Ongoing (interventions implemented as appropriate)  Progressing towards goals.    Federico Hilton PT,DPT  2019

## 2019-07-29 NOTE — PLAN OF CARE
Problem: Adult Inpatient Plan of Care  Goal: Plan of Care Review  Outcome: Ongoing (interventions implemented as appropriate)  Patient turned and repositioned self independently. No skin breakdown noted. Patient pain and safety monitored q 1-2 hrs this shift. Ambulates independently. Bed locked and in lowest position. Bed side rails elevated x 2. Call light and personal belongings in reach. VS q4h while awake, asleep this AM. Will cont. to monitor.   07/29/19 1750   Plan of Care Review   Plan of Care Reviewed With patient

## 2019-07-29 NOTE — PLAN OF CARE
Future Appointments   Date Time Provider Department Center   8/2/2019  1:00 PM June Rodriguez NP [facility]        07/29/19 1425   Final Note   Assessment Type Final Discharge Note   Anticipated Discharge Disposition Home   What phone number can be called within the next 1-3 days to see how you are doing after discharge? [phone]   Hospital Follow Up  Appt(s) scheduled? Yes   Right Care Referral Info   Post Acute Recommendation No Care

## 2019-07-29 NOTE — ASSESSMENT & PLAN NOTE
LFTs stable  Continue to monitor with daily labs  Continue thiamine, folic acid, and multivitamin  Continue nadolol      Statement Selected

## 2019-07-29 NOTE — PT/OT/SLP PROGRESS
"Physical Therapy Treatment    Patient Name:  Abdulkadir Gaines   MRN:  [id number]    Recommendations:     Discharge Recommendations:  nursing facility, skilled   Discharge Equipment Recommendations: walker, rolling   Barriers to discharge: Decreased caregiver support    Assessment:     Abdulkadir Gaines is a 59 y.o. male admitted with a medical diagnosis of GI bleed.  He presents with the following impairments/functional limitations:  weakness, impaired functional mobilty, gait instability, impaired endurance, impaired balance, impaired self care skills, decreased lower extremity function, decreased safety awareness.  Pt tolerated session c c/o frustration.  Pt voiced frustration c hospital stay and wanting to return home.  Pt performed mobility c SBA-SBA-min A.  Pt amb short distance in room and demo unsteadiness c 2x LOB requiring min A for correction.  Pt demo improved stability c use of RW.  Safe to amb c assistance of 1x person and RW.  Pt would benefit from continued skilled acute PT 3x/wk to improve functional mobility.      Rehab Prognosis: Good; patient would benefit from acute skilled PT services to address these deficits and reach maximum level of function.    Recent Surgery: Procedure(s) (LRB):  EGD (ESOPHAGOGASTRODUODENOSCOPY) (N/A) 7 Days Post-Op    Plan:     During this hospitalization, patient to be seen 3 x/week to address the identified rehab impairments via gait training, therapeutic activities, therapeutic exercises, neuromuscular re-education and progress toward the following goals:    · Plan of Care Expires:  08/19/19    Subjective     Chief Complaint: frustration  Patient/Family Comments/goals: "I don't want to be here anymore. I just want to go home."  Pain/Comfort:  · Pain Rating 1: 0/10      Objective:     Communicated with RN prior to session.  Patient found up in chair with peripheral IV, telemetry upon PT entry to room.     General Precautions: Standard, fall   Orthopedic Precautions:N/A "   Braces: N/A     Functional Mobility:  · Transfers:     · Sit to Stand:  stand by assistance with no AD  · Gait: 20ft c no AD CGA-min A; 20ft c RW SBA-CGA  · Balance: sitting(S); standing (CGA-min A)      AM-PAC 6 CLICK MOBILITY  Turning over in bed (including adjusting bedclothes, sheets and blankets)?: 4  Sitting down on and standing up from a chair with arms (e.g., wheelchair, bedside commode, etc.): 3  Moving from lying on back to sitting on the side of the bed?: 4  Moving to and from a bed to a chair (including a wheelchair)?: 3  Need to walk in hospital room?: 3  Climbing 3-5 steps with a railing?: 2  Basic Mobility Total Score: 19       Therapeutic Activities and Exercises:  Pt educated on: PT role/POC; safety c mobility; benefits of OOB activities; performing therex; d/c recs - v/u  -standing x1min  -educated on using RW for improved stability and safety    Patient left up in chair with all lines intact, call button in reach and RN notified..    GOALS:   Multidisciplinary Problems     Physical Therapy Goals        Problem: Physical Therapy Goal    Goal Priority Disciplines Outcome Goal Variances Interventions   Physical Therapy Goal     PT, PT/OT Ongoing (interventions implemented as appropriate)     Description:  Goals to be met by: 2019     Patient will increase functional independence with mobility by performin. Supine to sit with Modified [place]  2. Sit to supine with Modified [place]  3. Sit to stand transfer with Supervision  4. Bed to chair transfer with Supervision using LRAD  5. Gait  x 75 feet with Stand-by Assistance using LRAD                       Time Tracking:     PT Received On: 19  PT Start Time: 935     PT Stop Time: 943  PT Total Time (min): 8 min     Billable Minutes: Gait Training 8 min    Treatment Type: Treatment  PT/PTA: PT           Federico Hilton, PT  2019

## 2019-07-29 NOTE — DISCHARGE SUMMARY
[facility] Hospital Medicine  Discharge Summary      Patient Name: Abdulkadir Gaines  MRN: [id number]  Admission Date: 7/22/2019  Hospital Length of Stay: 7 days  Discharge Date and Time:  07/29/2019 3:04 PM  Attending Physician: Dayami Preciado MD   Discharging Provider: Dayami Preciado MD  Primary Care Provider: June Rodriguez NP  Hospital Medicine Team: [facility] HOSP MED G Dayami Preciado MD    HPI:   59 year old AA male PMH hepatitis C, alcohol abuse, HTN, anemia, gastric variceal bleed who presented to [facility] with hematemesis. He states yesterday he had abdominal pain, then multiple episodes of coffee ground emesis. He was found to have hgb 5.8, FOBT+, type and screened, given 2 units of blood, protonix, octreotide, then transferred to [facility] for Gastroenterology. He state he has had this occur twice in the past. He does endorse drinking 24 pack of beer/day. He states he has not had withdrawal seizures before. Last time he drink was yesterday prior to coming to the hospital      Procedure(s) (LRB):  EGD (ESOPHAGOGASTRODUODENOSCOPY) (N/A)      Hospital Course:   tient admitted to ICU on 7/22 out of concern for GI bleed. Hepatology and GI consulted on patient and he was started on IV protonix, octreotide, and received a total of 3 units of blood. Patient underwent EGD on 7/22, results of which showed small non-bleeding esophageal varices, one non-bleeding angioectasia in the duodenum; there was no clear source of bleed. Patient remained hemodynamically stable through out the night. On 7/23, patient transitioned to oral protonix BID. He remained hypertensive through his hospital stay. For this and non bleeding esophageal varices, patient was started on nadolol. Patient fit for step down on 7/23. Currently on liquid diet, orders to advance as tolerated. stepped down to medicine and continued to do well. He completed his course of ceftriaxone and octreotide as prescribed by GI. He was  evaluated by PTOT and recommended for SNF which he declined and also declined home health. He stated he was able to ambulate well and has a relative who did PTOT at a NH and she will assist him with his therapy needs.      Consults:   Consults (From admission, onward)        Status Ordering Provider     Inpatient consult to Gastroenterology  Once     Provider:  (Not yet assigned)    Completed KENDELL BOYLE          * GI bleed  Patient presented to [facility] with hematemesis and transferred to [facility]   EGD on 7/22:  Non-bleeding small (< 5 mm) esophageal varices.                        - LA Grade C reflux esophagitis.                        - Esophagogastric landmarks identified.                        - Type 2 gastroesophageal varices (GOV2, esophageal                         varices which extend along the fundus), without                         bleeding.                        - One non-bleeding angioectasia in the duodenum.    Continue PPI  octreotide, and ceftriaxone treatment complete  To follow up with GI in 2 months for repeat EGD and colonoscopy to be done at same time; GI to schedule follow up  H&H stable in the low 7s and no further bleeding     Lab Results   Component Value Date    HGB 7.8 (L) 07/29/2019       Debility  Cont with PT/OT for gait training and strengthening and restoration of ADL's   Encourage mobility, OOB in chair, and early ambulation as appropriate  Fall precautions   Monitor for bowel and bladder dysfunction  Monitor for and prevent skin breakdown and pressure ulcers  Plan for SNF placement.      Alcohol abuse  Patient stated he was able to quit drinking on his own in the past and will do so again  Declining resources at this time      Anemia  Per above      Alcoholic cirrhosis of liver  LFTs stable  Continue to monitor with daily labs  Continue thiamine, folic acid, and multivitamin  Continue nadolol         Final Active Diagnoses:    Diagnosis Date Noted POA     PRINCIPAL PROBLEM:  GI bleed [K92.2] 07/21/2019 Yes    Debility [R53.81] 07/26/2019 Yes    Alcohol abuse [F10.10] 07/22/2019 Yes    Anemia [D64.9] 06/08/2016 Yes    Alcoholic cirrhosis of liver [K70.30] 08/22/2014 Yes      Problems Resolved During this Admission:    Diagnosis Date Noted Date Resolved POA    Bleeding gastric varices [I86.4] 11/14/2013 07/23/2019 Yes       Discharged Condition: good    Disposition: Home or Self Care    Follow Up:    Patient Instructions:   No discharge procedures on file.    Significant Diagnostic Studies: see EGD    Pending Diagnostic Studies:     Procedure Component Value Units Date/Time    CBC auto differential [[id number]] Collected:  07/23/19 1207    Order Status:  Sent Lab Status:  In process Updated:  07/23/19 1207    Specimen:  Blood          Medications:  Reconciled Home Medications:      Medication List      START taking these medications    folic acid 1 MG tablet  Commonly known as:  FOLVITE  Take 1 tablet (1 mg total) by mouth once daily.     nadolol 20 MG tablet  Commonly known as:  CORGARD  Take 1 tablet (20 mg total) by mouth once daily.     pantoprazole 40 MG tablet  Commonly known as:  PROTONIX  Take 1 tablet (40 mg total) by mouth once daily.     THERA-TABS tablet  Generic drug:  multivitamin  Take 1 tablet by mouth once daily.     thiamine 100 MG tablet  Take 1 tablet (100 mg total) by mouth once daily.        CONTINUE taking these medications    timolol maleate 0.5% 0.5 % Drop  Commonly known as:  TIMOPTIC  INSTILL 1 DROP INTO LEFT EYE TWICE DAILY        STOP taking these medications    pramipexole 0.25 MG tablet  Commonly known as:  MIRAPEX            Indwelling Lines/Drains at time of discharge:   Lines/Drains/Airways          None          Dayami Preciado MD  Department of Hospital Medicine  [facility]

## 2019-07-29 NOTE — ASSESSMENT & PLAN NOTE
Patient presented to [facility] with hematemesis and transferred to [facility]   EGD on 7/22:  Non-bleeding small (< 5 mm) esophageal varices.                        - LA Grade C reflux esophagitis.                        - Esophagogastric landmarks identified.                        - Type 2 gastroesophageal varices (GOV2, esophageal                         varices which extend along the fundus), without                         bleeding.                        - One non-bleeding angioectasia in the duodenum.    Continue PPI  octreotide, and ceftriaxone treatment complete  To follow up with GI in 2 months for repeat EGD and colonoscopy to be done at same time; GI to schedule follow up  H&H stable in the low 7s and no further bleeding     Lab Results   Component Value Date    HGB 7.8 (L) 07/29/2019

## 2019-07-29 NOTE — PLAN OF CARE
Problem: Fall Injury Risk  Goal: Absence of Fall and Fall-Related Injury    Intervention: Identify and Manage Contributors to Fall Injury Risk     07/28/19 1953   Identify and Manage Contributors to Fall Injury Risk   Self-Care Promotion independence encouraged;BADL personal routines maintained;safe use of adaptive equipment encouraged   Manage Acute Allergic Reaction   Medication Review/Management medications reviewed;dosing adjusted   Patient alert and orientated , follows commands , bed in low position , call light within reach and patient demonstrated proper usage. Bed locked with brake , Room clutter free and personal items with reach, addressed care plan for today , all questions answered and allow patient time for clarification. Medications and diet fluid balance  instructions with signs and symptoms and the importance to continue once discharged .Reviewed discharged , next  Follow up appointment.

## 2019-07-29 NOTE — PLAN OF CARE
PT/OT recommended SNF, referrals were sent but patient decided he wanted to go home instead. Home Health was offered but patient declined.       07/29/19 1588   Post-Acute Status   Post-Acute Authorization Other   Post-Acute Placement Status Discharge Plan Changed   Other Status No Post-Acute Service Needs   Discharge Delays None known at this time

## 2019-07-31 ENCOUNTER — TELEPHONE (OUTPATIENT)
Dept: ENDOSCOPY | Facility: HOSPITAL | Age: 59
End: 2019-07-31

## 2019-07-31 NOTE — TELEPHONE ENCOUNTER
Holding spot on 9/26/19 at 1100am Dr. Cline. Pt. Will call back after he meets with PCP at [facility] to possibly have proc. There.  Appt. Is 8/2/19

## 2019-08-12 PROBLEM — R41.0 CONFUSION: Status: ACTIVE | Noted: 2019-08-12

## 2019-08-12 PROBLEM — F05 ACUTE CONFUSIONAL STATE: Status: ACTIVE | Noted: 2019-08-12

## 2019-08-13 PROBLEM — R63.8 INADEQUATE ORAL INTAKE: Status: ACTIVE | Noted: 2019-08-13

## 2019-08-13 PROBLEM — K74.60 CIRRHOSIS: Status: ACTIVE | Noted: 2019-08-13

## 2019-09-01 ENCOUNTER — EXTERNAL HOME HEALTH (OUTPATIENT)
Dept: HOME HEALTH SERVICES | Facility: HOSPITAL | Age: 59
End: 2019-09-01

## 2019-09-18 ENCOUNTER — TELEPHONE (OUTPATIENT)
Dept: ENDOSCOPY | Facility: HOSPITAL | Age: 59
End: 2019-09-18

## 2019-09-18 NOTE — TELEPHONE ENCOUNTER
Called pt. To schedule procedures and sister said he wants to have this in [place] at [facility].

## 2020-06-21 PROBLEM — I16.0 HYPERTENSIVE URGENCY: Status: ACTIVE | Noted: 2020-06-21

## 2020-06-21 PROBLEM — I16.1 HYPERTENSIVE EMERGENCY: Status: ACTIVE | Noted: 2020-06-21

## 2020-07-07 NOTE — NURSING
EMG      EMG and NCS Tests  Electromyography (EMG) and nerve conduction studies (NCS) are tests that measure muscle and nerve function. In most cases, both tests are performed. NCS is most often done first.    What Happens During These Tests?  · NCS checks how quickly impulses travel between nerves. To do this, mild electrical currents are applied to the skin on some parts of your body.  · EMG assesses muscle function. To do this, a fine needle is placed under your skin into the muscle being tested. This is repeated on other muscles. The needle allows the electrical activity in your muscles to be measured. No electrical currents are applied with the needle.  · During each test, wavy lines (waveforms) appear on a screen or on paper. These lines show how well your nerves and muscles work. These waveforms help to determine your test results.    Let the Technologist Know  For your safety and for the success of your test, tell the technologist if you:     -     Have a pacemaker  · Have any bleeding problems.  · Take blood thinners (anticoagulants) or other medications, including aspirin.  · Have any immune system problems.  · Have had neck or back surgery.  You may also be asked questions about your overall health.   Before the Test  Prepare for your test as instructed. Shower or bathe, but don't use powder, oil, or lotion. Your skin should be clean and free of excess oil. Wear loose fitting clothes. But know that you may be asked to change into a hospital gown. The entire test will take about 60 minutes. Be sure to allow extra time to check in.  During EMG, a fine needle is inserted into your muscle through your skin.   After Your Test  Before you leave, all electrodes will be removed. You can then get right back to your normal routine. If you feel tired or have some discomfort, take it easy. If you were told to stop taking any medications for your test, ask when you can start taking them again.           To schedule at  Pt transferred to 1105 with monitor and Sandostatin gtt in wheelchair. Report called to receiving RN. Bed alarm on. No distress upon transfer.    Success:  [phone] Ask for neurology and schedule EMG with  Dr. Pantoja    To schedule at [place]:  [phone] Ask for neurology and schedule EMG with Dr. Cruz Gutierrez    To schedule at [facility]:   [phone] Ask for neurology and schedule EMG with Dr. Cruz Gutierrez    To schedule at [place]:  [phone] Ask for neurology and schedule EMG with Dr. Owens    To schedule at [place]:  [phone] ask for Neurology and schedule EMG     To schedule at [place]:  [phone] ask for Neurology and schedule EMG     To schedule at [place]:  [phone] Ask for neurology and schedule EMG with Dr. Wallace    To schedule at [place]:   [phone] ask for Neurology and schedule EMG    To schedule at [facility]:  [phone] ask for Neurology and schedule EMG     To schedule with Dr. Salome Warren   [phone] ask for physical medicine and rehab [address] [place] [address]      -Schedule a follow up with your doctor to review the results and make a treatment plan for 1 week after the EMG is completed.

## 2020-10-14 PROBLEM — N17.9 AKI (ACUTE KIDNEY INJURY): Status: ACTIVE | Noted: 2020-10-14

## 2020-10-14 PROBLEM — R09.02 HYPOXIA: Status: ACTIVE | Noted: 2020-10-14

## 2022-09-10 NOTE — SUBJECTIVE & OBJECTIVE
Interval History: No acute issues. Awaiting placement.     Review of Systems   Constitutional: Negative for chills, fatigue and fever.   HENT: Negative.    Respiratory: Negative for cough and shortness of breath.    Cardiovascular: Negative for chest pain, palpitations and leg swelling.   Gastrointestinal: Negative for abdominal pain, diarrhea, nausea and vomiting.   Genitourinary: Negative for difficulty urinating, frequency and hematuria.   Musculoskeletal: Negative.    Skin: Negative for rash.   Neurological: Negative for light-headedness and headaches.   Hematological: Does not bruise/bleed easily.   Psychiatric/Behavioral: Negative for hallucinations. The patient is not nervous/anxious.      Objective:     Vital Signs (Most Recent):  Temp: 98 °F (36.7 °C) (07/28/19 1103)  Pulse: (!) 54 (07/28/19 1103)  Resp: 18 (07/28/19 1103)  BP: (!) 140/66 (07/28/19 1103)  SpO2: 100 % (07/28/19 1103) Vital Signs (24h Range):  Temp:  [97.9 °F (36.6 °C)-98 °F (36.7 °C)] 98 °F (36.7 °C)  Pulse:  [54-65] 54  Resp:  [18] 18  SpO2:  [99 %-100 %] 100 %  BP: (130-155)/(66-80) 140/66     Weight: 70.2 kg (154 lb 12.2 oz)  Body mass index is 24.24 kg/m².    Intake/Output Summary (Last 24 hours) at 7/28/2019 1616  Last data filed at 7/27/2019 1800  Gross per 24 hour   Intake 480 ml   Output 500 ml   Net -20 ml      Physical Exam   Constitutional: He is oriented to person, place, and time. No distress.   HENT:   Head: Normocephalic and atraumatic.   Eyes: No scleral icterus.   Neck: Neck supple.   Cardiovascular: Normal rate and regular rhythm.   Pulmonary/Chest: Effort normal and breath sounds normal. No respiratory distress.   Abdominal: Soft. Bowel sounds are normal. He exhibits no distension. There is no tenderness.   Musculoskeletal: He exhibits no edema.   Neurological: He is alert and oriented to person, place, and time.   Skin: No erythema.   Psychiatric: He has a normal mood and affect.   Vitals reviewed.      Significant Labs:  All pertinent labs within the past 24 hours have been reviewed.    Significant Imaging: I have reviewed all pertinent imaging results/findings within the past 24 hours.   4 = No assist / stand by assistance